# Patient Record
Sex: MALE | Race: WHITE | Employment: FULL TIME | URBAN - METROPOLITAN AREA
[De-identification: names, ages, dates, MRNs, and addresses within clinical notes are randomized per-mention and may not be internally consistent; named-entity substitution may affect disease eponyms.]

---

## 2017-05-24 ENCOUNTER — HOSPITAL ENCOUNTER (OUTPATIENT)
Age: 42
Discharge: NURSING, REHAB OR LONG-TERM CARE | End: 2017-05-24
Attending: FAMILY MEDICINE
Payer: COMMERCIAL

## 2017-05-24 ENCOUNTER — HOSPITAL ENCOUNTER (EMERGENCY)
Facility: HOSPITAL | Age: 42
Discharge: HOME | End: 2017-05-24
Attending: EMERGENCY MEDICINE
Payer: COMMERCIAL

## 2017-05-24 ENCOUNTER — APPOINTMENT (OUTPATIENT)
Dept: ULTRASOUND IMAGING | Facility: HOSPITAL | Age: 42
End: 2017-05-24
Attending: EMERGENCY MEDICINE
Payer: COMMERCIAL

## 2017-05-24 VITALS
TEMPERATURE: 98 F | HEART RATE: 83 BPM | HEIGHT: 70 IN | BODY MASS INDEX: 39.37 KG/M2 | OXYGEN SATURATION: 97 % | WEIGHT: 275 LBS | RESPIRATION RATE: 18 BRPM | DIASTOLIC BLOOD PRESSURE: 67 MMHG | SYSTOLIC BLOOD PRESSURE: 115 MMHG

## 2017-05-24 VITALS
WEIGHT: 275 LBS | HEIGHT: 70 IN | HEART RATE: 100 BPM | OXYGEN SATURATION: 99 % | TEMPERATURE: 98 F | BODY MASS INDEX: 39.37 KG/M2 | DIASTOLIC BLOOD PRESSURE: 78 MMHG | RESPIRATION RATE: 22 BRPM | SYSTOLIC BLOOD PRESSURE: 116 MMHG

## 2017-05-24 DIAGNOSIS — M79.604 PAIN IN RIGHT LEG: Primary | ICD-10-CM

## 2017-05-24 DIAGNOSIS — L03.115 CELLULITIS OF RIGHT LOWER EXTREMITY: Primary | ICD-10-CM

## 2017-05-24 PROCEDURE — 99284 EMERGENCY DEPT VISIT MOD MDM: CPT

## 2017-05-24 PROCEDURE — 93971 EXTREMITY STUDY: CPT | Performed by: EMERGENCY MEDICINE

## 2017-05-24 PROCEDURE — 99204 OFFICE O/P NEW MOD 45 MIN: CPT

## 2017-05-24 PROCEDURE — 85025 COMPLETE CBC W/AUTO DIFF WBC: CPT | Performed by: EMERGENCY MEDICINE

## 2017-05-24 PROCEDURE — 96365 THER/PROPH/DIAG IV INF INIT: CPT

## 2017-05-24 PROCEDURE — 80048 BASIC METABOLIC PNL TOTAL CA: CPT | Performed by: EMERGENCY MEDICINE

## 2017-05-24 RX ORDER — CALCIUM CARBONATE 200(500)MG
1 TABLET,CHEWABLE ORAL DAILY
COMMUNITY
End: 2018-02-09

## 2017-05-24 RX ORDER — LEVOFLOXACIN 750 MG/1
750 TABLET ORAL DAILY
Qty: 10 TABLET | Refills: 0 | Status: SHIPPED | OUTPATIENT
Start: 2017-05-24 | End: 2017-06-03

## 2017-05-24 RX ORDER — LEVOFLOXACIN 5 MG/ML
750 INJECTION, SOLUTION INTRAVENOUS EVERY 24 HOURS
Status: DISCONTINUED | OUTPATIENT
Start: 2017-05-24 | End: 2017-05-24

## 2017-05-24 RX ORDER — LANSOPRAZOLE 30 MG/1
30 CAPSULE, DELAYED RELEASE ORAL
COMMUNITY
End: 2018-01-26

## 2017-05-24 NOTE — ED NOTES
There is an area of red eczema patch to right lower inner aspect of leg with a small wound noted in area, and also a red, warm to touch patch to right outer aspect of lower leg that patient states is new. See Physical diagram for location.

## 2017-05-24 NOTE — ED NOTES
Called to [place] ED, report given to RN  for further evaluation. How Severe Are Your Spot(S)?: moderate Have Your Spot(S) Been Treated In The Past?: has not been treated Hpi Title: Evaluation of Skin Lesions Have Your Spot(S) Been Treated In The Past?: has been treated Additional History: Patient states Dr. Lowry treated the lesion under left eye, has not resolved. When Was It Treated?: Ln2 w/ Dr. Lowry

## 2017-05-24 NOTE — ED NOTES
Pt presents to ED with RLE erythema and pain which onset Monday. Pt reports pain is localized to anterior leg where erythema is noted. Denies pain in right calf.

## 2017-05-24 NOTE — ED PROVIDER NOTES
Patient Seen in: [facility] Department    History   Patient presents with:  Pain    Stated Complaint: from immed care. left leg pain.  r/o dvt    HPI    43year old male complains of 3 days of right anterior lower leg pain and redness with Head: Normocephalic and atraumatic. Head is without raccoon's eyes, without right periorbital erythema and without left periorbital erythema.    Nose: Nose normal.   Mouth/Throat: Mucous membranes are normal. Mucous membranes are not pale and not cyanotic Final Result    PROCEDURE: US VENOUS DOPPLER LEG RIGHT-DIAG IMG (CPT=93971)         COMPARISON: None.          INDICATIONS: Right leg swelling and lateral calf redness         TECHNIQUE: Color duplex Doppler venous ultrasound of the right lower     extremit Radiology reports and images reviewed personally and are negative for emergent cause of patient symptoms. I discussed pertinent results, any required  acute management issues, and/or I did need for follow-up, with the patient/gaurdian.  See radiology report

## 2017-05-24 NOTE — ED NOTES
Pt instructed to go directly to [place] ED for further evaluation to r/o DVT right lower leg. Pt verbalizes understanding.

## 2017-05-24 NOTE — ED PROVIDER NOTES
Patient Seen [address]: [address]    History   No chief complaint on file. Stated Complaint: Fever     HPI    51-year-old male presents with 2 days of increasing right lower extremity swelling and redness.   He notes gradual 39.46 kg/m2  SpO2 99%        Physical Exam   Constitutional: He is oriented to person, place, and time. He appears well-developed and well-nourished. Cardiovascular: Normal rate, regular rhythm, normal heart sounds and intact distal pulses.     Pulmonary/

## 2017-05-24 NOTE — ED INITIAL ASSESSMENT (HPI)
Pt here to IC with c/o feeling of fever for several days and noticed a red patch on right lower leg that is warm to touch and pt states it hurts to walk and is \"tight\" compared to left leg.   There is swelling noted to right lower leg area compared to lef

## 2017-12-26 ENCOUNTER — HOSPITAL ENCOUNTER (OUTPATIENT)
Age: 42
Discharge: HOME | End: 2017-12-26
Attending: EMERGENCY MEDICINE
Payer: COMMERCIAL

## 2017-12-26 VITALS
HEART RATE: 90 BPM | OXYGEN SATURATION: 98 % | SYSTOLIC BLOOD PRESSURE: 133 MMHG | DIASTOLIC BLOOD PRESSURE: 82 MMHG | TEMPERATURE: 99 F | RESPIRATION RATE: 16 BRPM

## 2017-12-26 DIAGNOSIS — H66.92 ACUTE LEFT OTITIS MEDIA: Primary | ICD-10-CM

## 2017-12-26 PROCEDURE — 87430 STREP A AG IA: CPT

## 2017-12-26 PROCEDURE — 99214 OFFICE O/P EST MOD 30 MIN: CPT

## 2017-12-26 PROCEDURE — 99213 OFFICE O/P EST LOW 20 MIN: CPT

## 2017-12-26 RX ORDER — VARENICLINE TARTRATE 0.5 MG/1
0.5 TABLET, FILM COATED ORAL 2 TIMES DAILY
COMMUNITY
End: 2018-01-26

## 2017-12-26 RX ORDER — SULFAMETHOXAZOLE AND TRIMETHOPRIM 800; 160 MG/1; MG/1
1 TABLET ORAL 2 TIMES DAILY
Qty: 14 TABLET | Refills: 0 | Status: SHIPPED | OUTPATIENT
Start: 2017-12-26 | End: 2018-01-02

## 2017-12-27 NOTE — ED PROVIDER NOTES
Patient Seen in: [address]    History   Patient presents with:  Sore Throat    Stated Complaint: Sore throat, ear pain    HPI    The patient's a 44-year-old male who complains of a a URI for the last week and now notes all antibiotics except for Levaquin. I asked the patient has feels ever had a sulfa medication and he stated no therefore we will treat with Bactrim as it has a much better side effect profile than Levaquin.           Disposition and Plan     Clinical Impr

## 2017-12-27 NOTE — ED NOTES
Pt discharged home , prescription electronically sent to the pharmacy , pt instructed to follow up with primary md if symptoms do not improve

## 2017-12-27 NOTE — ED INITIAL ASSESSMENT (HPI)
Pt here with complaints of a sore throat since Wednesday, pt states he has been running fevers , pt states that he is having bilateral ear pain that started today

## 2018-01-26 ENCOUNTER — OFFICE VISIT (OUTPATIENT)
Dept: FAMILY MEDICINE CLINIC | Facility: CLINIC | Age: 43
End: 2018-01-26

## 2018-01-26 VITALS
DIASTOLIC BLOOD PRESSURE: 72 MMHG | OXYGEN SATURATION: 97 % | WEIGHT: 249 LBS | TEMPERATURE: 98 F | HEIGHT: 70 IN | HEART RATE: 76 BPM | SYSTOLIC BLOOD PRESSURE: 118 MMHG | BODY MASS INDEX: 35.65 KG/M2

## 2018-01-26 DIAGNOSIS — J18.9 COMMUNITY ACQUIRED PNEUMONIA, UNSPECIFIED LATERALITY: Primary | ICD-10-CM

## 2018-01-26 DIAGNOSIS — H65.195 OTHER RECURRENT ACUTE NONSUPPURATIVE OTITIS MEDIA OF LEFT EAR: ICD-10-CM

## 2018-01-26 PROCEDURE — 94640 AIRWAY INHALATION TREATMENT: CPT | Performed by: FAMILY MEDICINE

## 2018-01-26 PROCEDURE — 99203 OFFICE O/P NEW LOW 30 MIN: CPT | Performed by: FAMILY MEDICINE

## 2018-01-26 RX ORDER — CODEINE PHOSPHATE AND GUAIFENESIN 10; 100 MG/5ML; MG/5ML
SOLUTION ORAL
Refills: 0 | COMMUNITY
Start: 2018-01-21 | End: 2018-01-26 | Stop reason: ALTCHOICE

## 2018-01-26 RX ORDER — DOXYCYCLINE 100 MG/1
CAPSULE ORAL
Refills: 0 | COMMUNITY
Start: 2018-01-21 | End: 2018-01-26

## 2018-01-26 RX ORDER — BENZONATATE 200 MG/1
200 CAPSULE ORAL 3 TIMES DAILY PRN
COMMUNITY
Start: 2018-01-21 | End: 2018-02-09

## 2018-01-26 RX ORDER — SULFAMETHOXAZOLE AND TRIMETHOPRIM 800; 160 MG/1; MG/1
1 TABLET ORAL 2 TIMES DAILY
Qty: 20 TABLET | Refills: 0 | Status: SHIPPED | OUTPATIENT
Start: 2018-01-26 | End: 2018-02-05

## 2018-01-26 RX ORDER — DOXYCYCLINE 100 MG/1
100 CAPSULE ORAL 2 TIMES DAILY
COMMUNITY
Start: 2018-01-21 | End: 2018-02-09

## 2018-01-26 RX ORDER — BENZONATATE 200 MG/1
CAPSULE ORAL
Refills: 0 | COMMUNITY
Start: 2018-01-21 | End: 2018-01-26

## 2018-01-26 RX ORDER — ALBUTEROL SULFATE 2.5 MG/3ML
2.5 SOLUTION RESPIRATORY (INHALATION) ONCE
Status: COMPLETED | OUTPATIENT
Start: 2018-01-26 | End: 2018-01-26

## 2018-01-26 RX ORDER — ALBUTEROL SULFATE 90 UG/1
2 AEROSOL, METERED RESPIRATORY (INHALATION) EVERY 6 HOURS PRN
Qty: 1 INHALER | Refills: 0 | Status: SHIPPED | OUTPATIENT
Start: 2018-01-26 | End: 2018-02-09

## 2018-01-26 RX ADMIN — ALBUTEROL SULFATE 2.5 MG: 2.5 SOLUTION RESPIRATORY (INHALATION) at 14:31:00

## 2018-01-26 NOTE — PATIENT INSTRUCTIONS
Pneumonia (Adult)  Pneumonia is an infection deep within the lungs. It is in the small air sacs (alveoli). Pneumonia may be caused by a virus or bacteria. Pneumonia caused by bacteria is usually treated with an antibiotic.  Severe cases may need to be median If you are 72 or older, you should get a pneumococcal vaccine and a yearly flu (influenza) shot. You should also get these vaccines if you have chronic lung disease like asthma, emphysema, or COPD.  Recently, a second type of pneumonia vaccine has become av

## 2018-01-26 NOTE — PROGRESS NOTES
CC:  Patient presents with:  Establish Care  Follow - Up: was seen at an urgent care for Pneumonia was given antibiotics- cough is a little better      HPI: 43year old male here to follow-up on urgent care visit for pneumonia.   Reports he had wheezing, co Obesity  No date: Psoriasis     History reviewed. No pertinent surgical history.       Social History  Social History   Marital status: Single  Spouse name: N/A    Years of education: N/A  Number of children: N/A     Occupational History  None on file     S laterality    - Mildly improved on day 6 of Doxycycline  - Given wheezing on history and exam and slight improvement after in-office Albuterol breathing treatment, patient given prescription for Albuterol inhaler to use at home every 4-6 hours as needed

## 2018-02-09 ENCOUNTER — APPOINTMENT (OUTPATIENT)
Dept: LAB | Facility: REFERENCE LAB | Age: 43
End: 2018-02-09
Attending: FAMILY MEDICINE
Payer: COMMERCIAL

## 2018-02-09 ENCOUNTER — OFFICE VISIT (OUTPATIENT)
Dept: FAMILY MEDICINE CLINIC | Facility: CLINIC | Age: 43
End: 2018-02-09

## 2018-02-09 VITALS
BODY MASS INDEX: 35.79 KG/M2 | OXYGEN SATURATION: 98 % | DIASTOLIC BLOOD PRESSURE: 82 MMHG | HEART RATE: 72 BPM | HEIGHT: 70 IN | SYSTOLIC BLOOD PRESSURE: 124 MMHG | WEIGHT: 250 LBS

## 2018-02-09 DIAGNOSIS — Z23 NEED FOR VACCINATION: ICD-10-CM

## 2018-02-09 DIAGNOSIS — Z00.01 ENCOUNTER FOR ROUTINE ADULT HEALTH EXAMINATION WITH ABNORMAL FINDINGS: Primary | ICD-10-CM

## 2018-02-09 DIAGNOSIS — Z13.220 SCREENING CHOLESTEROL LEVEL: ICD-10-CM

## 2018-02-09 DIAGNOSIS — Z13.1 DIABETES MELLITUS SCREENING: ICD-10-CM

## 2018-02-09 DIAGNOSIS — Z87.891 PERSONAL HISTORY OF TOBACCO USE, PRESENTING HAZARDS TO HEALTH: ICD-10-CM

## 2018-02-09 PROBLEM — J18.9 COMMUNITY ACQUIRED PNEUMONIA: Status: RESOLVED | Noted: 2018-01-26 | Resolved: 2018-02-09

## 2018-02-09 PROBLEM — L40.9 PSORIASIS: Status: ACTIVE | Noted: 2018-02-09

## 2018-02-09 LAB
CHOLEST SERPL-MCNC: 150 MG/DL (ref 110–200)
HDLC SERPL-MCNC: 43 MG/DL
LDLC SERPL CALC-MCNC: 89 MG/DL (ref 0–99)
NONHDLC SERPL-MCNC: 107 MG/DL
TRIGL SERPL-MCNC: 90 MG/DL (ref 1–149)

## 2018-02-09 PROCEDURE — 36415 COLL VENOUS BLD VENIPUNCTURE: CPT | Performed by: FAMILY MEDICINE

## 2018-02-09 PROCEDURE — 90686 IIV4 VACC NO PRSV 0.5 ML IM: CPT | Performed by: FAMILY MEDICINE

## 2018-02-09 PROCEDURE — 80061 LIPID PANEL: CPT | Performed by: FAMILY MEDICINE

## 2018-02-09 PROCEDURE — 90472 IMMUNIZATION ADMIN EACH ADD: CPT | Performed by: FAMILY MEDICINE

## 2018-02-09 PROCEDURE — 83036 HEMOGLOBIN GLYCOSYLATED A1C: CPT | Performed by: FAMILY MEDICINE

## 2018-02-09 PROCEDURE — 90715 TDAP VACCINE 7 YRS/> IM: CPT | Performed by: FAMILY MEDICINE

## 2018-02-09 PROCEDURE — 99396 PREV VISIT EST AGE 40-64: CPT | Performed by: FAMILY MEDICINE

## 2018-02-09 PROCEDURE — 90471 IMMUNIZATION ADMIN: CPT | Performed by: FAMILY MEDICINE

## 2018-02-09 NOTE — PROGRESS NOTES
Mick Pitts is a 43year old male who presents for a complete physical exam.   HPI:   Has not had a physical in a few years. Concerns: Weight, has been making changes this new year with better exercise routine but still eats poorly at times.    Has psor History   Problem Relation Age of Onset   • Heart Attack Father      MI x 6   • Diabetes Father    • Alcohol and Other Disorders Associated Father    • Cancer Mother      Kidney   • Heart Attack Mother    • Cancer Maternal Grandmother    • Cancer Maternal Vaccine, Quadravalent, Preservative-Free, Prefilled    Prescription for Chantix provided given concern for stress of job and risk of starting smoking again.      Discussed with patient the following:  -Risks and benefits of screening for prostate cancer: Pl

## 2018-02-09 NOTE — PATIENT INSTRUCTIONS
Prevention Guidelines, Men Ages 36 to 52  Screening tests and vaccines are an important part of managing your health. Health counseling is essential, too. Below are guidelines for these, for men ages 36 to 52.  Talk with your healthcare provider to make s Chickenpox (varicella) All men in this age group who have no record of this infection or vaccine 2 doses; the second dose should be given at least 4 weeks after the first dose   Hepatitis A Men at increased risk for infection – talk with your healthcare pr Use of tobacco and the health effects it can cause All men in this age group Every exam   [facility]  Date Last Reviewed: 2/1/2017  © 3170-5157 [employer] [address].  [address] [facility]

## 2018-02-10 LAB — HBA1C MFR BLD: 5.3 % (ref 4–6)

## 2018-02-25 ENCOUNTER — HOSPITAL ENCOUNTER (OUTPATIENT)
Age: 43
Discharge: HOME | End: 2018-02-25
Attending: EMERGENCY MEDICINE
Payer: COMMERCIAL

## 2018-02-25 VITALS
TEMPERATURE: 98 F | SYSTOLIC BLOOD PRESSURE: 126 MMHG | HEART RATE: 64 BPM | RESPIRATION RATE: 20 BRPM | OXYGEN SATURATION: 98 % | DIASTOLIC BLOOD PRESSURE: 76 MMHG

## 2018-02-25 DIAGNOSIS — S39.012A STRAIN OF LUMBAR REGION, INITIAL ENCOUNTER: Primary | ICD-10-CM

## 2018-02-25 PROCEDURE — 99213 OFFICE O/P EST LOW 20 MIN: CPT

## 2018-02-25 PROCEDURE — 99214 OFFICE O/P EST MOD 30 MIN: CPT

## 2018-02-25 RX ORDER — KETOROLAC TROMETHAMINE 10 MG/1
10 TABLET, FILM COATED ORAL EVERY 8 HOURS
Qty: 20 TABLET | Refills: 0 | Status: SHIPPED | OUTPATIENT
Start: 2018-02-25 | End: 2018-03-04

## 2018-02-25 RX ORDER — CYCLOBENZAPRINE HCL 10 MG
10 TABLET ORAL 3 TIMES DAILY PRN
Qty: 20 TABLET | Refills: 0 | Status: SHIPPED | OUTPATIENT
Start: 2018-02-25 | End: 2018-03-04

## 2018-02-25 RX ORDER — PREDNISONE 20 MG/1
60 TABLET ORAL ONCE
Status: COMPLETED | OUTPATIENT
Start: 2018-02-25 | End: 2018-02-25

## 2018-02-25 RX ORDER — PREDNISONE 20 MG/1
40 TABLET ORAL DAILY
Qty: 6 TABLET | Refills: 0 | Status: SHIPPED | OUTPATIENT
Start: 2018-02-25 | End: 2018-02-28

## 2018-02-25 NOTE — ED INITIAL ASSESSMENT (HPI)
Pt here with complaints of lower back pain that started a couple weeks ago and has gotten worse that its really hard to ambulate now, pt denies any injuries

## 2018-02-25 NOTE — ED NOTES
Pt discharged home , prescription electronically sent to the pharmacy , pt instructed to follow up with his primary md if symptoms do not improve

## 2018-02-25 NOTE — ED PROVIDER NOTES
Patient Seen in: [address]    History   Patient presents with:  Back Pain (musculoskeletal)    Stated Complaint: severe back pain    HPI    Chief complaint: Back pain    History of present illness:   The patient complains Grandfather        Smoking status: Former Smoker                                                              Packs/day: 0.00      Years: 20.00        Quit date: 10/15/2017  Smokeless tobacco: Never Used                      Alcohol use:  Yes              C diagnosis)    Disposition:  Discharge    Follow-up:  Nieves Ramos, [address] [facility]  [address]  [address] [facility], [facility], [address] [facility] [address] [phone]            Medications Prescribed:  C

## 2019-02-20 ENCOUNTER — OFFICE VISIT (OUTPATIENT)
Dept: FAMILY MEDICINE CLINIC | Facility: CLINIC | Age: 44
End: 2019-02-20
Payer: COMMERCIAL

## 2019-02-20 ENCOUNTER — LAB ENCOUNTER (OUTPATIENT)
Dept: LAB | Facility: REFERENCE LAB | Age: 44
End: 2019-02-20
Attending: FAMILY MEDICINE
Payer: COMMERCIAL

## 2019-02-20 VITALS
DIASTOLIC BLOOD PRESSURE: 80 MMHG | HEART RATE: 101 BPM | HEIGHT: 70 IN | SYSTOLIC BLOOD PRESSURE: 122 MMHG | WEIGHT: 283 LBS | BODY MASS INDEX: 40.52 KG/M2 | OXYGEN SATURATION: 98 %

## 2019-02-20 DIAGNOSIS — Z23 NEED FOR VACCINATION: ICD-10-CM

## 2019-02-20 DIAGNOSIS — Z00.01 ENCOUNTER FOR ROUTINE ADULT HEALTH EXAMINATION WITH ABNORMAL FINDINGS: ICD-10-CM

## 2019-02-20 DIAGNOSIS — M25.562 ARTHRALGIA OF BOTH KNEES: ICD-10-CM

## 2019-02-20 DIAGNOSIS — L40.9 PSORIASIS: ICD-10-CM

## 2019-02-20 DIAGNOSIS — M25.561 ARTHRALGIA OF BOTH KNEES: ICD-10-CM

## 2019-02-20 DIAGNOSIS — Z00.01 ENCOUNTER FOR ROUTINE ADULT HEALTH EXAMINATION WITH ABNORMAL FINDINGS: Primary | ICD-10-CM

## 2019-02-20 LAB
BASOPHILS # BLD AUTO: 0.05 X10(3) UL (ref 0–0.2)
BASOPHILS NFR BLD AUTO: 0.6 %
CHOLEST SMN-MCNC: 122 MG/DL (ref ?–200)
DEPRECATED RDW RBC AUTO: 38.5 FL (ref 35.1–46.3)
EOSINOPHIL # BLD AUTO: 0.45 X10(3) UL (ref 0–0.7)
EOSINOPHIL NFR BLD AUTO: 5.4 %
ERYTHROCYTE [DISTWIDTH] IN BLOOD BY AUTOMATED COUNT: 12.3 % (ref 11–15)
ERYTHROCYTE [SEDIMENTATION RATE] IN BLOOD: 6 MM/HR (ref 0–15)
EST. AVERAGE GLUCOSE BLD GHB EST-MCNC: 111 MG/DL (ref 68–126)
HBA1C MFR BLD HPLC: 5.5 % (ref ?–5.7)
HCT VFR BLD AUTO: 47.1 % (ref 39–53)
HDLC SERPL-MCNC: 37 MG/DL (ref 40–59)
HGB BLD-MCNC: 15.8 G/DL (ref 13–17.5)
IMM GRANULOCYTES # BLD AUTO: 0.02 X10(3) UL (ref 0–1)
IMM GRANULOCYTES NFR BLD: 0.2 %
LDLC SERPL CALC-MCNC: 53 MG/DL (ref ?–100)
LYMPHOCYTES # BLD AUTO: 2.44 X10(3) UL (ref 1–4)
LYMPHOCYTES NFR BLD AUTO: 29.1 %
MCH RBC QN AUTO: 28.9 PG (ref 26–34)
MCHC RBC AUTO-ENTMCNC: 33.5 G/DL (ref 31–37)
MCV RBC AUTO: 86.1 FL (ref 80–100)
MONOCYTES # BLD AUTO: 0.56 X10(3) UL (ref 0.1–1)
MONOCYTES NFR BLD AUTO: 6.7 %
NEUTROPHILS # BLD AUTO: 4.87 X10 (3) UL (ref 1.5–7.7)
NEUTROPHILS # BLD AUTO: 4.87 X10(3) UL (ref 1.5–7.7)
NEUTROPHILS NFR BLD AUTO: 58 %
NONHDLC SERPL-MCNC: 85 MG/DL (ref ?–130)
PLATELET # BLD AUTO: 269 10(3)UL (ref 150–450)
RBC # BLD AUTO: 5.47 X10(6)UL (ref 4.3–5.7)
TRIGL SERPL-MCNC: 158 MG/DL (ref 30–149)
WBC # BLD AUTO: 8.4 X10(3) UL (ref 4–11)

## 2019-02-20 PROCEDURE — 36415 COLL VENOUS BLD VENIPUNCTURE: CPT

## 2019-02-20 PROCEDURE — 80061 LIPID PANEL: CPT

## 2019-02-20 PROCEDURE — 85652 RBC SED RATE AUTOMATED: CPT

## 2019-02-20 PROCEDURE — 90686 IIV4 VACC NO PRSV 0.5 ML IM: CPT | Performed by: FAMILY MEDICINE

## 2019-02-20 PROCEDURE — 85025 COMPLETE CBC W/AUTO DIFF WBC: CPT

## 2019-02-20 PROCEDURE — 99396 PREV VISIT EST AGE 40-64: CPT | Performed by: FAMILY MEDICINE

## 2019-02-20 PROCEDURE — 83036 HEMOGLOBIN GLYCOSYLATED A1C: CPT

## 2019-02-20 PROCEDURE — 90471 IMMUNIZATION ADMIN: CPT | Performed by: FAMILY MEDICINE

## 2019-02-20 RX ORDER — CLOBETASOL PROPIONATE 0.5 MG/G
1 OINTMENT TOPICAL 2 TIMES DAILY
Qty: 60 G | Refills: 0 | Status: SHIPPED | OUTPATIENT
Start: 2019-02-20 | End: 2021-03-04

## 2019-02-20 NOTE — PROGRESS NOTES
Wilton Tatum is a 37year old male who presents for a complete physical exam.   HPI:   Patient presents with:  Physical  Derm Problem: needs a referral for derm because he has a rash that looks like psoriasis on his hands and feet    Concerns: Started smo • Cancer Mother         Kidney   • Heart Attack Mother    • Cancer Maternal Grandmother    • Cancer Maternal Grandfather    • Cancer Paternal Grandmother         Leukemia   • Cancer Paternal Grandfather    • Dementia Paternal Grandfather       Social His Preservative Free [40206]    Start Clobetasol ointment twice daily x 2-4 weeks for psoriasis flare-ups of thumbs and toes and also to schedule with dermatology to discuss long-term management.   Will also check ESR given concerns for joint pain and possible

## 2019-02-20 NOTE — PATIENT INSTRUCTIONS
Managing Psoriasis     Take baths in warm water to help soften scales. The success of your medical treatment depends on you. When your healthcare provider gives you a treatment plan, ask when you should expect to see results. Then, follow your plan.  If · Use over-the-counter hydrocortisone cream for itching to reduce scaling for active outbreaks. Ask your healthcare provider about long-term use.   · Stick with treatment that your healthcare provider has recommended for you, especially if it's controlling If your blood pressure is higher than normal, follow the advice of your healthcare provider      Depression All men in this age group At routine exams   Type 2 diabetes or prediabetes All men beginning at age 39 and men  without symptoms at any age who are Haemophilus influenzae Type B (HIB) Men at increased risk for infection – talk with your healthcare provider 1 to 3 doses   Influenza (flu) All men in this age group Once a year   Measles, mumps, rubella (MMR) All men in this age group who have no record o

## 2019-04-01 ENCOUNTER — HOSPITAL ENCOUNTER (OUTPATIENT)
Age: 44
Discharge: HOME | End: 2019-04-01
Payer: COMMERCIAL

## 2019-04-01 ENCOUNTER — APPOINTMENT (OUTPATIENT)
Dept: GENERAL RADIOLOGY | Age: 44
End: 2019-04-01
Attending: FAMILY MEDICINE
Payer: COMMERCIAL

## 2019-04-01 VITALS
WEIGHT: 275 LBS | TEMPERATURE: 97 F | OXYGEN SATURATION: 100 % | HEART RATE: 90 BPM | DIASTOLIC BLOOD PRESSURE: 72 MMHG | RESPIRATION RATE: 18 BRPM | BODY MASS INDEX: 39.37 KG/M2 | SYSTOLIC BLOOD PRESSURE: 129 MMHG | HEIGHT: 70 IN

## 2019-04-01 DIAGNOSIS — S46.011A ROTATOR CUFF STRAIN, RIGHT, INITIAL ENCOUNTER: Primary | ICD-10-CM

## 2019-04-01 PROCEDURE — 73030 X-RAY EXAM OF SHOULDER: CPT | Performed by: FAMILY MEDICINE

## 2019-04-01 PROCEDURE — 99214 OFFICE O/P EST MOD 30 MIN: CPT

## 2019-04-01 PROCEDURE — 99213 OFFICE O/P EST LOW 20 MIN: CPT

## 2019-04-01 RX ORDER — ETODOLAC 500 MG/1
500 TABLET, FILM COATED ORAL 2 TIMES DAILY
Qty: 28 TABLET | Refills: 0 | Status: SHIPPED | OUTPATIENT
Start: 2019-04-01 | End: 2019-04-15

## 2019-04-01 NOTE — ED NOTES
Pt discharged home stable and in good condition by self. All prescriptions reviewed. Avs explained. Follow up with pcp. Pt verbalized understanding and agreed.

## 2019-04-01 NOTE — ED INITIAL ASSESSMENT (HPI)
Pt reports right shoulder pain that has been intermittent for months, pt reports worse pain with certain movement denies injury. Reports had trauma to shoulder when he was 12 yrs old denies any recent injury.

## 2019-04-01 NOTE — ED PROVIDER NOTES
Patient Seen in: [address]    History   Patient presents with:  Shoulder Pain    Stated Complaint: Right Shoulder Pain     HPI    HPI: Rayna Bolanos is a 40year old male who presents with right shoulder pain of gradual [04/01/19 1421]   /72   Pulse 90   Resp 18   Temp 97.2 °F (36.2 °C)   Temp src Oral   SpO2 100 %   O2 Device None (Room air)       Current:/72   Pulse 90   Temp 97.2 °F (36.2 °C) (Oral)   Resp 18   Ht 177.8 cm (5' 10\")   Wt 124.7 kg   SpO2 100

## 2019-04-09 ENCOUNTER — OFFICE VISIT (OUTPATIENT)
Dept: FAMILY MEDICINE CLINIC | Facility: CLINIC | Age: 44
End: 2019-04-09
Payer: COMMERCIAL

## 2019-04-09 VITALS
HEIGHT: 70 IN | BODY MASS INDEX: 40.8 KG/M2 | DIASTOLIC BLOOD PRESSURE: 74 MMHG | HEART RATE: 80 BPM | OXYGEN SATURATION: 98 % | WEIGHT: 285 LBS | SYSTOLIC BLOOD PRESSURE: 126 MMHG

## 2019-04-09 DIAGNOSIS — M75.81 RIGHT ROTATOR CUFF TENDINITIS: Primary | ICD-10-CM

## 2019-04-09 PROCEDURE — 20610 DRAIN/INJ JOINT/BURSA W/O US: CPT | Performed by: FAMILY MEDICINE

## 2019-04-09 PROCEDURE — 99213 OFFICE O/P EST LOW 20 MIN: CPT | Performed by: FAMILY MEDICINE

## 2019-04-09 RX ORDER — LIDOCAINE HYDROCHLORIDE 10 MG/ML
2 INJECTION, SOLUTION INFILTRATION; PERINEURAL ONCE
Status: COMPLETED | OUTPATIENT
Start: 2019-04-09 | End: 2019-04-09

## 2019-04-09 RX ORDER — TRIAMCINOLONE ACETONIDE 40 MG/ML
40 INJECTION, SUSPENSION INTRA-ARTICULAR; INTRAMUSCULAR ONCE
Status: COMPLETED | OUTPATIENT
Start: 2019-04-09 | End: 2019-04-09

## 2019-04-09 RX ADMIN — TRIAMCINOLONE ACETONIDE 40 MG: 40 INJECTION, SUSPENSION INTRA-ARTICULAR; INTRAMUSCULAR at 14:46:00

## 2019-04-09 RX ADMIN — LIDOCAINE HYDROCHLORIDE 2 ML: 10 INJECTION, SOLUTION INFILTRATION; PERINEURAL at 14:45:00

## 2019-04-09 NOTE — PROGRESS NOTES
CC:  Patient presents with: Follow - Up: was seen at the immediate care on4/1 for right shoulder pain- still having pain      HPI: 40year old male here to discuss right shoulder pain after immediate care visit 4/1.   Reports he  his shoulder twic since quittin.4      Smokeless tobacco: Never Used    Substance and Sexual Activity      Alcohol use: Yes        Comment: rare bourbon       Drug use: Yes        Types: Cannabis        Comment: Occasionally       Sexual activity: Not on file    Lifesty shoulder: tenderness to palpation of anterior shoulder and supraspinatus tendon insertion. Full range of shoulder motion but pain reported. 5/5 muscle strength bilateral proximal and distal upper extremities. Positive Job's and Hawkin's testing.  Negative N

## 2019-06-10 ENCOUNTER — OFFICE VISIT (OUTPATIENT)
Dept: SURGERY | Facility: CLINIC | Age: 44
End: 2019-06-10
Payer: COMMERCIAL

## 2019-06-10 VITALS
HEART RATE: 84 BPM | BODY MASS INDEX: 40.06 KG/M2 | DIASTOLIC BLOOD PRESSURE: 81 MMHG | HEIGHT: 70.5 IN | OXYGEN SATURATION: 97 % | WEIGHT: 283 LBS | SYSTOLIC BLOOD PRESSURE: 144 MMHG | RESPIRATION RATE: 16 BRPM

## 2019-06-10 DIAGNOSIS — E66.01 MORBID OBESITY WITH BMI OF 40.0-44.9, ADULT (HCC): ICD-10-CM

## 2019-06-10 DIAGNOSIS — E78.1 HYPERTRIGLYCERIDEMIA: Primary | ICD-10-CM

## 2019-06-10 DIAGNOSIS — R03.0 ELEVATED BLOOD PRESSURE READING: ICD-10-CM

## 2019-06-10 DIAGNOSIS — F43.9 STRESS: ICD-10-CM

## 2019-06-10 DIAGNOSIS — R06.83 SNORING: ICD-10-CM

## 2019-06-10 PROCEDURE — 99204 OFFICE O/P NEW MOD 45 MIN: CPT | Performed by: INTERNAL MEDICINE

## 2019-06-10 RX ORDER — HYDROCHLOROTHIAZIDE 12.5 MG/1
12.5 CAPSULE, GELATIN COATED ORAL DAILY
Qty: 30 CAPSULE | Refills: 1 | Status: SHIPPED | OUTPATIENT
Start: 2019-06-10 | End: 2019-08-24

## 2019-06-10 RX ORDER — TOPIRAMATE 25 MG/1
25 TABLET ORAL EVERY EVENING
Qty: 30 TABLET | Refills: 2 | Status: SHIPPED | OUTPATIENT
Start: 2019-06-10 | End: 2021-03-04

## 2019-06-10 RX ORDER — VARENICLINE TARTRATE 0.5 MG/1
0.5 TABLET, FILM COATED ORAL 2 TIMES DAILY
COMMUNITY
End: 2019-07-25 | Stop reason: DRUGHIGH

## 2019-06-10 NOTE — PROGRESS NOTES
[facility] Consultation Note       Date of Consult:  6/10/2019    Patient:  Leonard Ross  :      3/1/1975  MRN:      [id number]    Referring Provider: Dr. Esperanza Slater       Chief Complaint:  Patient presents with:  Consult  Weight Manageme Substance and Sexual Activity      Alcohol use: Yes        Comment: rare bourbon       Drug use: Yes        Types: Cannabis        Comment: Occasionally       Sexual activity: Not on file    Lifestyle      Physical activity:        Days per week: Not on fi fast food meals/week:  4 meals/week    Nutritional Goals Reviewed and Discussed:     Limit carbohydrates to 100 gms per day, Eat 100-200 calories within 1 hour of waking up and Eat 3-4 cups of fresh fruit or vegetables daily    Behavior Modifications Revie Encounter Diagnosis(ses):   Hypertriglyceridemia  (primary encounter diagnosis)  Stress  Snoring  Morbid obesity with BMI of 40.0-44.9, adult (HCC)  Elevated blood pressure reading    PLAN     Patient is not interested in bariatric surgery.  Kiera mouth daily.         oDminique Mccarthy MD

## 2019-06-27 ENCOUNTER — MED REC SCAN ONLY (OUTPATIENT)
Dept: FAMILY MEDICINE CLINIC | Facility: CLINIC | Age: 44
End: 2019-06-27

## 2019-07-25 ENCOUNTER — OFFICE VISIT (OUTPATIENT)
Dept: SURGERY | Facility: CLINIC | Age: 44
End: 2019-07-25
Payer: COMMERCIAL

## 2019-07-25 ENCOUNTER — TELEPHONE (OUTPATIENT)
Dept: FAMILY MEDICINE CLINIC | Facility: CLINIC | Age: 44
End: 2019-07-25

## 2019-07-25 VITALS
DIASTOLIC BLOOD PRESSURE: 70 MMHG | HEIGHT: 70.5 IN | HEART RATE: 76 BPM | SYSTOLIC BLOOD PRESSURE: 123 MMHG | RESPIRATION RATE: 16 BRPM | OXYGEN SATURATION: 96 % | WEIGHT: 273 LBS | BODY MASS INDEX: 38.65 KG/M2

## 2019-07-25 DIAGNOSIS — E78.1 HYPERTRIGLYCERIDEMIA: Primary | ICD-10-CM

## 2019-07-25 DIAGNOSIS — E66.9 OBESITY (BMI 30-39.9): ICD-10-CM

## 2019-07-25 DIAGNOSIS — R06.83 SNORING: Primary | ICD-10-CM

## 2019-07-25 DIAGNOSIS — F43.9 STRESS: ICD-10-CM

## 2019-07-25 DIAGNOSIS — Z51.81 ENCOUNTER FOR THERAPEUTIC DRUG MONITORING: ICD-10-CM

## 2019-07-25 PROCEDURE — 99214 OFFICE O/P EST MOD 30 MIN: CPT | Performed by: INTERNAL MEDICINE

## 2019-07-25 RX ORDER — VARENICLINE TARTRATE 1 MG/1
1 TABLET, FILM COATED ORAL 2 TIMES DAILY
Qty: 60 TABLET | Refills: 6 | Status: SHIPPED | OUTPATIENT
Start: 2019-07-25 | End: 2020-02-20

## 2019-07-25 NOTE — TELEPHONE ENCOUNTER
Pt encouraged to make appt for sleep study. Pt was driving, this RN will send a [employer] message with the contact info to schedule that appt. Pt verbalized understanding and agrees with plan.     Citlalli Velasco 10 Dr. Dee Bolton             Please

## 2019-07-25 NOTE — PROGRESS NOTES
[address]  Dept: [phone]       Patient:  Sandra Garcia  :      3/1/1975  MRN:      [id number]    Chief Complaint:  Patient presents wit Food insecurity:        Worry: Not on file        Inability: Not on file      Transportation needs:        Medical: Not on file        Non-medical: Not on file    Tobacco Use      Smoking status: Former Smoker        Years: 20.00        Quit date: 10/15/20 Discussed:       · Patient has a Food Journal?: yes   · Patient is reading nutrition labels? yes  · Average Caloric Intake:     · Average CHO Intake: 130  · Is patient exercising? no  · Type of exercise?      Eating Habits  · Patient states the following: extremities normal, atraumatic, no cyanosis or edema  Pulses: 2+ and symmetric  Skin: Skin color, texture, turgor normal. No rashes or lesions  Neurologic: Grossly normal    ASSESSMENT     HYPERCHOLESTEROLEMIA:  The patient states that his cholesterol has

## 2019-08-24 DIAGNOSIS — R03.0 ELEVATED BLOOD PRESSURE READING: ICD-10-CM

## 2019-08-24 DIAGNOSIS — E66.01 MORBID OBESITY WITH BMI OF 40.0-44.9, ADULT (HCC): ICD-10-CM

## 2019-08-26 RX ORDER — HYDROCHLOROTHIAZIDE 12.5 MG/1
CAPSULE, GELATIN COATED ORAL
Qty: 90 CAPSULE | Refills: 0 | Status: SHIPPED | OUTPATIENT
Start: 2019-08-26 | End: 2021-03-04 | Stop reason: ALTCHOICE

## 2019-10-30 ENCOUNTER — OFFICE VISIT (OUTPATIENT)
Dept: SURGERY | Facility: CLINIC | Age: 44
End: 2019-10-30
Payer: COMMERCIAL

## 2019-10-30 ENCOUNTER — APPOINTMENT (OUTPATIENT)
Dept: LAB | Facility: HOSPITAL | Age: 44
End: 2019-10-30
Attending: INTERNAL MEDICINE
Payer: COMMERCIAL

## 2019-10-30 VITALS
DIASTOLIC BLOOD PRESSURE: 72 MMHG | HEIGHT: 70.5 IN | HEART RATE: 77 BPM | SYSTOLIC BLOOD PRESSURE: 110 MMHG | OXYGEN SATURATION: 97 % | WEIGHT: 275 LBS | BODY MASS INDEX: 38.93 KG/M2

## 2019-10-30 DIAGNOSIS — E78.1 HYPERTRIGLYCERIDEMIA: Primary | ICD-10-CM

## 2019-10-30 DIAGNOSIS — Z51.81 ENCOUNTER FOR THERAPEUTIC DRUG MONITORING: ICD-10-CM

## 2019-10-30 DIAGNOSIS — E78.1 HYPERTRIGLYCERIDEMIA: ICD-10-CM

## 2019-10-30 DIAGNOSIS — F43.9 STRESS: ICD-10-CM

## 2019-10-30 DIAGNOSIS — E66.9 OBESITY (BMI 30-39.9): ICD-10-CM

## 2019-10-30 PROCEDURE — 99214 OFFICE O/P EST MOD 30 MIN: CPT | Performed by: INTERNAL MEDICINE

## 2019-10-30 PROCEDURE — 93010 ELECTROCARDIOGRAM REPORT: CPT | Performed by: INTERNAL MEDICINE

## 2019-10-30 PROCEDURE — 93005 ELECTROCARDIOGRAM TRACING: CPT

## 2019-10-30 RX ORDER — PHENTERMINE HYDROCHLORIDE 15 MG/1
15 CAPSULE ORAL EVERY MORNING
Qty: 30 CAPSULE | Refills: 2 | Status: SHIPPED | OUTPATIENT
Start: 2019-10-30 | End: 2020-01-15 | Stop reason: DRUGHIGH

## 2019-10-30 NOTE — PROGRESS NOTES
[address] [place] [address]  Dept: [phone]       Patient:  Say Dean  :      3/1/1975  MRN:      [id number]    Chief Complaint:  Patient presents wit Financial resource strain: Not on file      Food insecurity:        Worry: Not on file        Inability: Not on file      Transportation needs:        Medical: Not on file        Non-medical: Not on file    Tobacco Use      Smoking status: Former Smoker [facility]  · Reviewed and Discussed:       · Patient has a Food Journal?: yes   · Patient is reading nutrition labels? yes  · Average Caloric Intake:     · Average CHO Intake: 130  · Is patient exercising? no  · Type of exercise? rhythm  Abdomen: soft, obese, non tender  Extremities: extremities normal, atraumatic, no cyanosis or edema  Pulses: 2+ and symmetric  Skin: Skin color, texture, turgor normal. No rashes or lesions  Neurologic: Grossly normal    ASSESSMENT     HYPERCHOLEST conjunction with the above diet and exercise program. The patient will check his heart rate and blood pressure on a regular basis. He will call me if his BP goes over 140/90 or if he has palpitations or racing heart rate.  He understands that I will not chloe

## 2020-01-15 ENCOUNTER — OFFICE VISIT (OUTPATIENT)
Dept: SURGERY | Facility: CLINIC | Age: 45
End: 2020-01-15
Payer: COMMERCIAL

## 2020-01-15 VITALS
HEIGHT: 70.5 IN | HEART RATE: 95 BPM | DIASTOLIC BLOOD PRESSURE: 91 MMHG | WEIGHT: 276 LBS | BODY MASS INDEX: 39.07 KG/M2 | SYSTOLIC BLOOD PRESSURE: 134 MMHG

## 2020-01-15 DIAGNOSIS — Z51.81 ENCOUNTER FOR THERAPEUTIC DRUG MONITORING: ICD-10-CM

## 2020-01-15 DIAGNOSIS — F43.9 STRESS: ICD-10-CM

## 2020-01-15 DIAGNOSIS — E66.9 OBESITY (BMI 30-39.9): ICD-10-CM

## 2020-01-15 DIAGNOSIS — I10 ESSENTIAL HYPERTENSION: Primary | ICD-10-CM

## 2020-01-15 PROCEDURE — 99214 OFFICE O/P EST MOD 30 MIN: CPT | Performed by: INTERNAL MEDICINE

## 2020-01-15 RX ORDER — PHENTERMINE HYDROCHLORIDE 30 MG/1
30 CAPSULE ORAL EVERY MORNING
Qty: 30 CAPSULE | Refills: 2 | Status: SHIPPED | OUTPATIENT
Start: 2020-01-15 | End: 2021-03-04

## 2020-01-15 NOTE — PROGRESS NOTES
[address], [address]  Dept: [phone]       Patient:  Keily Ulloa  :      3/1/1975  MRN:      [id number]    Chief Complaint:  Patient presents wit education: Not on file      Highest education level: Not on file    Occupational History      Not on file    Social Needs      Financial resource strain: Not on file      Food insecurity:        Worry: Not on file        Inability: Not on file      Transpo Grandfather    • Cancer Paternal Grandmother         Leukemia   • Cancer Paternal Grandfather    • Dementia Paternal Grandfather        Food Journal  · Reviewed and Discussed:       · Patient has a Food Journal?: yes   · Patient is reading nutrition labels tenderness.   Lungs: clear to auscultation bilaterally  Heart: S1, S2 normal, no murmur, click, rub or gallop, regular rate and rhythm  Abdomen: soft, obese, non tender  Extremities: extremities normal, atraumatic, no cyanosis or edema  Pulses: 2+ and symme for this visit:    Essential hypertension    Stress    Encounter for therapeutic drug monitoring    Obesity (BMI 30-39. 9)          Taz Ochoa MD

## 2021-03-04 ENCOUNTER — OFFICE VISIT (OUTPATIENT)
Dept: FAMILY MEDICINE CLINIC | Facility: CLINIC | Age: 46
End: 2021-03-04
Payer: COMMERCIAL

## 2021-03-04 VITALS
WEIGHT: 289 LBS | HEIGHT: 70.5 IN | DIASTOLIC BLOOD PRESSURE: 86 MMHG | OXYGEN SATURATION: 99 % | SYSTOLIC BLOOD PRESSURE: 130 MMHG | BODY MASS INDEX: 40.91 KG/M2 | HEART RATE: 97 BPM

## 2021-03-04 DIAGNOSIS — R63.5 WEIGHT GAIN: ICD-10-CM

## 2021-03-04 DIAGNOSIS — L98.9 CRACKING SKIN: ICD-10-CM

## 2021-03-04 DIAGNOSIS — Z23 NEED FOR VACCINATION: ICD-10-CM

## 2021-03-04 DIAGNOSIS — R06.83 SNORING: ICD-10-CM

## 2021-03-04 DIAGNOSIS — Z00.01 ENCOUNTER FOR ROUTINE ADULT HEALTH EXAMINATION WITH ABNORMAL FINDINGS: Primary | ICD-10-CM

## 2021-03-04 DIAGNOSIS — Z12.11 COLON CANCER SCREENING: ICD-10-CM

## 2021-03-04 PROBLEM — E66.9 OBESITY (BMI 30-39.9): Status: RESOLVED | Noted: 2019-07-25 | Resolved: 2021-03-04

## 2021-03-04 PROBLEM — I10 ESSENTIAL HYPERTENSION: Status: RESOLVED | Noted: 2020-01-15 | Resolved: 2021-03-04

## 2021-03-04 PROCEDURE — 3075F SYST BP GE 130 - 139MM HG: CPT | Performed by: FAMILY MEDICINE

## 2021-03-04 PROCEDURE — 90686 IIV4 VACC NO PRSV 0.5 ML IM: CPT | Performed by: FAMILY MEDICINE

## 2021-03-04 PROCEDURE — 3079F DIAST BP 80-89 MM HG: CPT | Performed by: FAMILY MEDICINE

## 2021-03-04 PROCEDURE — 99396 PREV VISIT EST AGE 40-64: CPT | Performed by: FAMILY MEDICINE

## 2021-03-04 PROCEDURE — 99213 OFFICE O/P EST LOW 20 MIN: CPT | Performed by: FAMILY MEDICINE

## 2021-03-04 PROCEDURE — 90471 IMMUNIZATION ADMIN: CPT | Performed by: FAMILY MEDICINE

## 2021-03-04 PROCEDURE — 3008F BODY MASS INDEX DOCD: CPT | Performed by: FAMILY MEDICINE

## 2021-03-04 RX ORDER — VARENICLINE TARTRATE 1 MG/1
1 TABLET, FILM COATED ORAL 2 TIMES DAILY
COMMUNITY
End: 2021-03-04

## 2021-03-04 RX ORDER — PHENTERMINE HYDROCHLORIDE 15 MG/1
15 CAPSULE ORAL EVERY MORNING
Qty: 30 CAPSULE | Refills: 0 | Status: SHIPPED | OUTPATIENT
Start: 2021-03-04 | End: 2021-06-03

## 2021-03-04 RX ORDER — VARENICLINE TARTRATE 1 MG/1
1 TABLET, FILM COATED ORAL 2 TIMES DAILY
Qty: 180 TABLET | Refills: 1 | Status: SHIPPED | OUTPATIENT
Start: 2021-03-04 | End: 2021-07-23

## 2021-03-04 NOTE — PROGRESS NOTES
Demetrice Lange is a 55year old male who presents for a complete physical exam.   HPI:   Patient presents with: Annual: flu shot  Medication Request: needs refills      Concerns: Weight gain and how it might impact his long term health.  Was seeing Dr. Maryjo Hemphill 15 MG Oral Cap Take 1 capsule (15 mg total) by mouth every morning.  30 capsule 0      Past Medical History:   Diagnosis Date   • Hypertriglyceridemia    • Morbid obesity with BMI of 40.0-44.9, adult ([facility] [address]    • Obesity    • Psoriasis    • Stress       Histo grossly intact    Cholesterol, Total (mg/dL)   Date Value   02/20/2019 122   02/09/2018 150     HDL Cholesterol (mg/dL)   Date Value   02/20/2019 37 (L)   02/09/2018 43     LDL Cholesterol (mg/dL)   Date Value   02/20/2019 53   02/09/2018 89      ASSESSMEN attacks and colon  -Recommendation for yearly influenza vaccine  -Need for Tdap once as an adult and Td booster every 10 years  -Need for Zoster vaccine for patients >= 50  -Contraception: partner methods, condoms, vasectomy  -STI screening (GC/Chlamydia/H

## 2021-03-08 ENCOUNTER — LAB ENCOUNTER (OUTPATIENT)
Dept: LAB | Facility: REFERENCE LAB | Age: 46
End: 2021-03-08
Attending: FAMILY MEDICINE
Payer: COMMERCIAL

## 2021-03-08 DIAGNOSIS — Z00.01 ENCOUNTER FOR ROUTINE ADULT HEALTH EXAMINATION WITH ABNORMAL FINDINGS: ICD-10-CM

## 2021-03-08 LAB
ALBUMIN SERPL-MCNC: 3.9 G/DL (ref 3.4–5)
ALBUMIN/GLOB SERPL: 1.4 {RATIO} (ref 1–2)
ALP LIVER SERPL-CCNC: 81 U/L
ALT SERPL-CCNC: 51 U/L
ANION GAP SERPL CALC-SCNC: 4 MMOL/L (ref 0–18)
AST SERPL-CCNC: 16 U/L (ref 15–37)
BASOPHILS # BLD AUTO: 0.07 X10(3) UL (ref 0–0.2)
BASOPHILS NFR BLD AUTO: 0.8 %
BILIRUB SERPL-MCNC: 0.8 MG/DL (ref 0.1–2)
BUN BLD-MCNC: 15 MG/DL (ref 7–18)
BUN/CREAT SERPL: 17.2 (ref 10–20)
CALCIUM BLD-MCNC: 8.7 MG/DL (ref 8.5–10.1)
CHLORIDE SERPL-SCNC: 105 MMOL/L (ref 98–112)
CHOLEST SMN-MCNC: 138 MG/DL (ref ?–200)
CO2 SERPL-SCNC: 31 MMOL/L (ref 21–32)
CREAT BLD-MCNC: 0.87 MG/DL
DEPRECATED RDW RBC AUTO: 38.8 FL (ref 35.1–46.3)
EOSINOPHIL # BLD AUTO: 0.58 X10(3) UL (ref 0–0.7)
EOSINOPHIL NFR BLD AUTO: 6.7 %
ERYTHROCYTE [DISTWIDTH] IN BLOOD BY AUTOMATED COUNT: 12.2 % (ref 11–15)
EST. AVERAGE GLUCOSE BLD GHB EST-MCNC: 117 MG/DL (ref 68–126)
GLOBULIN PLAS-MCNC: 2.7 G/DL (ref 2.8–4.4)
GLUCOSE BLD-MCNC: 85 MG/DL (ref 70–99)
HBA1C MFR BLD HPLC: 5.7 % (ref ?–5.7)
HCT VFR BLD AUTO: 48.9 %
HCV AB SERPL QL IA: NONREACTIVE
HDLC SERPL-MCNC: 37 MG/DL (ref 40–59)
HGB BLD-MCNC: 16 G/DL
IMM GRANULOCYTES # BLD AUTO: 0.02 X10(3) UL (ref 0–1)
IMM GRANULOCYTES NFR BLD: 0.2 %
LDLC SERPL CALC-MCNC: 54 MG/DL (ref ?–100)
LYMPHOCYTES # BLD AUTO: 2.07 X10(3) UL (ref 1–4)
LYMPHOCYTES NFR BLD AUTO: 23.9 %
M PROTEIN MFR SERPL ELPH: 6.6 G/DL (ref 6.4–8.2)
MCH RBC QN AUTO: 28.5 PG (ref 26–34)
MCHC RBC AUTO-ENTMCNC: 32.7 G/DL (ref 31–37)
MCV RBC AUTO: 87 FL
MONOCYTES # BLD AUTO: 0.66 X10(3) UL (ref 0.1–1)
MONOCYTES NFR BLD AUTO: 7.6 %
NEUTROPHILS # BLD AUTO: 5.25 X10 (3) UL (ref 1.5–7.7)
NEUTROPHILS # BLD AUTO: 5.25 X10(3) UL (ref 1.5–7.7)
NEUTROPHILS NFR BLD AUTO: 60.8 %
NONHDLC SERPL-MCNC: 101 MG/DL (ref ?–130)
OSMOLALITY SERPL CALC.SUM OF ELEC: 290 MOSM/KG (ref 275–295)
PATIENT FASTING Y/N/NP: NO
PATIENT FASTING Y/N/NP: NO
PLATELET # BLD AUTO: 274 10(3)UL (ref 150–450)
POTASSIUM SERPL-SCNC: 3.7 MMOL/L (ref 3.5–5.1)
RBC # BLD AUTO: 5.62 X10(6)UL
SODIUM SERPL-SCNC: 140 MMOL/L (ref 136–145)
TRIGL SERPL-MCNC: 236 MG/DL (ref 30–149)
VLDLC SERPL CALC-MCNC: 47 MG/DL (ref 0–30)
WBC # BLD AUTO: 8.7 X10(3) UL (ref 4–11)

## 2021-03-08 PROCEDURE — 36415 COLL VENOUS BLD VENIPUNCTURE: CPT

## 2021-03-08 PROCEDURE — 80061 LIPID PANEL: CPT

## 2021-03-08 PROCEDURE — 83036 HEMOGLOBIN GLYCOSYLATED A1C: CPT

## 2021-03-08 PROCEDURE — 80053 COMPREHEN METABOLIC PANEL: CPT

## 2021-03-08 PROCEDURE — 85025 COMPLETE CBC W/AUTO DIFF WBC: CPT

## 2021-03-08 PROCEDURE — 86803 HEPATITIS C AB TEST: CPT

## 2021-06-03 ENCOUNTER — OFFICE VISIT (OUTPATIENT)
Dept: FAMILY MEDICINE CLINIC | Facility: CLINIC | Age: 46
End: 2021-06-03
Payer: COMMERCIAL

## 2021-06-03 VITALS
BODY MASS INDEX: 40.63 KG/M2 | HEIGHT: 70.5 IN | SYSTOLIC BLOOD PRESSURE: 130 MMHG | HEART RATE: 84 BPM | WEIGHT: 287 LBS | OXYGEN SATURATION: 98 % | DIASTOLIC BLOOD PRESSURE: 88 MMHG

## 2021-06-03 DIAGNOSIS — G89.29 CHRONIC LEFT SHOULDER PAIN: ICD-10-CM

## 2021-06-03 DIAGNOSIS — M25.512 CHRONIC LEFT SHOULDER PAIN: ICD-10-CM

## 2021-06-03 DIAGNOSIS — E66.01 MORBID OBESITY WITH BMI OF 40.0-44.9, ADULT (HCC): ICD-10-CM

## 2021-06-03 DIAGNOSIS — L28.2 PRURITIC RASH: Primary | ICD-10-CM

## 2021-06-03 PROCEDURE — 3079F DIAST BP 80-89 MM HG: CPT | Performed by: FAMILY MEDICINE

## 2021-06-03 PROCEDURE — 99214 OFFICE O/P EST MOD 30 MIN: CPT | Performed by: FAMILY MEDICINE

## 2021-06-03 PROCEDURE — 3075F SYST BP GE 130 - 139MM HG: CPT | Performed by: FAMILY MEDICINE

## 2021-06-03 PROCEDURE — 3008F BODY MASS INDEX DOCD: CPT | Performed by: FAMILY MEDICINE

## 2021-06-03 RX ORDER — PHENTERMINE HYDROCHLORIDE 15 MG/1
15 CAPSULE ORAL EVERY MORNING
Qty: 30 CAPSULE | Refills: 0 | Status: SHIPPED | OUTPATIENT
Start: 2021-06-03 | End: 2021-08-10

## 2021-06-03 RX ORDER — PREDNISONE 20 MG/1
40 TABLET ORAL DAILY
Qty: 10 TABLET | Refills: 0 | Status: SHIPPED | OUTPATIENT
Start: 2021-06-03 | End: 2021-06-08

## 2021-06-03 RX ORDER — LORATADINE 10 MG/1
10 TABLET ORAL DAILY
COMMUNITY
End: 2021-08-10

## 2021-06-03 NOTE — PROGRESS NOTES
HPI:    Patient ID: Danita Archer is a 55year old male who presents for rash, left shoulder pain, and phentermine refill. HPI  Rash:  On b/l arms, chest, underneath breasts, abdomen, and flanks.    Has had chronic itching that initially started on back Exam  Constitutional:       General: He is not in acute distress. Appearance: Normal appearance. He is obese. He is not ill-appearing, toxic-appearing or diaphoretic. HENT:      Head: Normocephalic and atraumatic.    Eyes:      Extraocular Movements: prn on more bothersome areas.   -Cold compresses & showers prn.   -Pt to call/message if persists despite above.      2. Chronic left shoulder pain  -Acute on chronic.   -Ddx includes rotator cuff tendinopathy vs biceps tendinitis vs AC joint OA  -Discussed

## 2021-07-26 RX ORDER — VARENICLINE TARTRATE 1 MG/1
1 TABLET, FILM COATED ORAL 2 TIMES DAILY
Qty: 180 TABLET | Refills: 1 | Status: SHIPPED | OUTPATIENT
Start: 2021-07-26

## 2021-07-26 NOTE — TELEPHONE ENCOUNTER
A refill request was received for:  Requested Prescriptions     Pending Prescriptions Disp Refills   • Varenicline Tartrate 1 MG Oral Tab 180 tablet 1     Sig: Take 1 tablet (1 mg total) by mouth 2 (two) times daily.      Last refill date: 03/04/2021  Qty:

## 2021-08-09 ENCOUNTER — HOSPITAL ENCOUNTER (OUTPATIENT)
Age: 46
Discharge: HOME | End: 2021-08-09
Payer: COMMERCIAL

## 2021-08-09 VITALS
SYSTOLIC BLOOD PRESSURE: 124 MMHG | DIASTOLIC BLOOD PRESSURE: 81 MMHG | HEART RATE: 84 BPM | OXYGEN SATURATION: 100 % | RESPIRATION RATE: 18 BRPM | TEMPERATURE: 98 F

## 2021-08-09 DIAGNOSIS — L08.9 ABRASION OF KNEE, INFECTED, LEFT, INITIAL ENCOUNTER: Primary | ICD-10-CM

## 2021-08-09 DIAGNOSIS — S80.212A ABRASION OF KNEE, INFECTED, LEFT, INITIAL ENCOUNTER: Primary | ICD-10-CM

## 2021-08-09 PROCEDURE — 99213 OFFICE O/P EST LOW 20 MIN: CPT | Performed by: NURSE PRACTITIONER

## 2021-08-09 RX ORDER — CLINDAMYCIN HYDROCHLORIDE 300 MG/1
300 CAPSULE ORAL 3 TIMES DAILY
Qty: 15 CAPSULE | Refills: 0 | Status: SHIPPED | OUTPATIENT
Start: 2021-08-09 | End: 2021-08-14

## 2021-08-09 RX ORDER — SULFAMETHOXAZOLE AND TRIMETHOPRIM 800; 160 MG/1; MG/1
1 TABLET ORAL 2 TIMES DAILY
Qty: 14 TABLET | Refills: 0 | Status: SHIPPED | OUTPATIENT
Start: 2021-08-09 | End: 2021-08-09

## 2021-08-09 NOTE — ED INITIAL ASSESSMENT (HPI)
Pt here s/p fall on Friday afternoon. Pt tripped on curb and sustained abrasion to left knee and left rib pain.  Denies SOB

## 2021-08-09 NOTE — ED PROVIDER NOTES
Patient Seen in: [facility]      History   Patient presents with:  Fall    Stated Complaint: FALL    HPI/Subjective:   Well-appearing 49-year-old male presents with complaints of a possible infection to his left knee after falling and scrapi Physical Exam  VS: Vital signs reviewed. 02 saturation within normal limits for this patient. General: Patient is awake and alert, oriented to person, place and time. Pt appears non-toxic. HEENT: Head is normocephalic, atraumatic.  No conjunc return/ED precautions.   Disposition and Plan     Clinical Impression:  Abrasion of knee, infected, left, initial encounter  (primary encounter diagnosis)     Disposition:  Discharge  8/9/2021  5:38 pm    Follow-up:  Jacki Rashid [address] 16-May-2017 08:49

## 2021-08-10 ENCOUNTER — OFFICE VISIT (OUTPATIENT)
Dept: SURGERY | Facility: CLINIC | Age: 46
End: 2021-08-10
Payer: COMMERCIAL

## 2021-08-10 VITALS
HEIGHT: 69.9 IN | SYSTOLIC BLOOD PRESSURE: 125 MMHG | HEART RATE: 115 BPM | DIASTOLIC BLOOD PRESSURE: 81 MMHG | OXYGEN SATURATION: 96 % | WEIGHT: 268.38 LBS | BODY MASS INDEX: 38.42 KG/M2

## 2021-08-10 DIAGNOSIS — E66.9 OBESITY (BMI 30-39.9): ICD-10-CM

## 2021-08-10 DIAGNOSIS — F43.9 STRESS: ICD-10-CM

## 2021-08-10 DIAGNOSIS — F17.200 TOBACCO DEPENDENCE: ICD-10-CM

## 2021-08-10 DIAGNOSIS — I10 ESSENTIAL HYPERTENSION: Primary | ICD-10-CM

## 2021-08-10 DIAGNOSIS — Z51.81 ENCOUNTER FOR THERAPEUTIC DRUG MONITORING: ICD-10-CM

## 2021-08-10 PROCEDURE — 3008F BODY MASS INDEX DOCD: CPT | Performed by: INTERNAL MEDICINE

## 2021-08-10 PROCEDURE — 3074F SYST BP LT 130 MM HG: CPT | Performed by: INTERNAL MEDICINE

## 2021-08-10 PROCEDURE — 99214 OFFICE O/P EST MOD 30 MIN: CPT | Performed by: INTERNAL MEDICINE

## 2021-08-10 PROCEDURE — 3079F DIAST BP 80-89 MM HG: CPT | Performed by: INTERNAL MEDICINE

## 2021-08-10 RX ORDER — BUPROPION HYDROCHLORIDE 150 MG/1
150 TABLET ORAL DAILY
Qty: 30 TABLET | Refills: 2 | Status: SHIPPED | OUTPATIENT
Start: 2021-08-10 | End: 2021-11-09 | Stop reason: DRUGHIGH

## 2021-08-10 RX ORDER — PHENTERMINE HYDROCHLORIDE 15 MG/1
15 CAPSULE ORAL EVERY MORNING
Qty: 30 CAPSULE | Refills: 2 | Status: SHIPPED | OUTPATIENT
Start: 2021-08-10 | End: 2021-11-09

## 2021-08-10 NOTE — PROGRESS NOTES
[address], [address]  Dept: [phone]       Patient:  Mick Pitts  :      3/1/1975  MRN:      [id number]    Chief Complaint:  Patient presents wit file      Years of education: Not on file      Highest education level: Not on file    Occupational History      Not on file    Tobacco Use      Smoking status: Former Smoker        Years: 20.00        Quit date: 10/15/2017        Years since quitting: 3.8 Kidney   • Heart Attack Mother    • Cancer Maternal Grandmother    • Cancer Maternal Grandfather    • Cancer Paternal Grandmother         Leukemia   • Cancer Paternal Grandfather    • Dementia Paternal Grandfather        Food Journal  · Reviewed and Discus intact. Fundi benign. Back: symmetric, no curvature. ROM normal. No CVA tenderness.   Lungs: clear to auscultation bilaterally  Heart: S1, S2 normal, no murmur, click, rub or gallop, regular rate and rhythm  Abdomen: soft, obese, non tender  Extremities: e topiramate together    Needs to quit smoking. On Chantix at times.    Will restart Wellbutrin     PHENTERMINE: will refill at 15 mg  ekg done    Diagnoses and all orders for this visit:    Essential hypertension    Stress  -     buPROPion 150 MG Oral Tablet

## 2021-08-13 ENCOUNTER — HOSPITAL ENCOUNTER (OUTPATIENT)
Age: 46
Discharge: HOME | End: 2021-08-13
Payer: COMMERCIAL

## 2021-08-13 ENCOUNTER — APPOINTMENT (OUTPATIENT)
Dept: GENERAL RADIOLOGY | Age: 46
End: 2021-08-13
Attending: EMERGENCY MEDICINE
Payer: COMMERCIAL

## 2021-08-13 VITALS
RESPIRATION RATE: 18 BRPM | TEMPERATURE: 98 F | HEART RATE: 79 BPM | OXYGEN SATURATION: 98 % | SYSTOLIC BLOOD PRESSURE: 122 MMHG | DIASTOLIC BLOOD PRESSURE: 70 MMHG

## 2021-08-13 DIAGNOSIS — S23.41XA SPRAIN OF COSTAL CARTILAGE, INITIAL ENCOUNTER: Primary | ICD-10-CM

## 2021-08-13 PROCEDURE — 99214 OFFICE O/P EST MOD 30 MIN: CPT | Performed by: EMERGENCY MEDICINE

## 2021-08-13 PROCEDURE — 71101 X-RAY EXAM UNILAT RIBS/CHEST: CPT | Performed by: EMERGENCY MEDICINE

## 2021-08-13 RX ORDER — METHOCARBAMOL 500 MG/1
500 TABLET, FILM COATED ORAL 3 TIMES DAILY PRN
Qty: 12 TABLET | Refills: 0 | Status: SHIPPED | OUTPATIENT
Start: 2021-08-13 | End: 2021-11-09 | Stop reason: ALTCHOICE

## 2021-08-13 NOTE — ED INITIAL ASSESSMENT (HPI)
Pt states was seen last week after a fall. Pt states had had some rib pain at the time but very minimal. Pt states has become worse and now having difficulty taking a deep breath.

## 2021-08-13 NOTE — ED PROVIDER NOTES
Patient Seen in: [facility]      History   Patient presents with:  Fall    Stated Complaint: cp    HPI/Subjective:   HPI    Nany Garcia is a 55year old male here for fall one week ago. Having left rib pain.  Felt as pop today, and sx got b Abdomen is soft. Musculoskeletal:      Cervical back: Normal range of motion. Skin:     Findings: No rash. Neurological:      Mental Status: He is alert and oriented to person, place, and time.    Psychiatric:         Mood and Affect: Mood normal. needed.   Qty: 12 tablet Refills: 0

## 2021-11-09 ENCOUNTER — OFFICE VISIT (OUTPATIENT)
Dept: SURGERY | Facility: CLINIC | Age: 46
End: 2021-11-09
Payer: COMMERCIAL

## 2021-11-09 VITALS
HEIGHT: 69.9 IN | DIASTOLIC BLOOD PRESSURE: 88 MMHG | SYSTOLIC BLOOD PRESSURE: 139 MMHG | WEIGHT: 264.31 LBS | BODY MASS INDEX: 37.84 KG/M2 | HEART RATE: 102 BPM | OXYGEN SATURATION: 98 %

## 2021-11-09 DIAGNOSIS — I10 ESSENTIAL HYPERTENSION: ICD-10-CM

## 2021-11-09 DIAGNOSIS — I10 ESSENTIAL HYPERTENSION: Primary | ICD-10-CM

## 2021-11-09 DIAGNOSIS — E66.9 OBESITY (BMI 30-39.9): ICD-10-CM

## 2021-11-09 DIAGNOSIS — R03.0 ELEVATED BLOOD PRESSURE READING: ICD-10-CM

## 2021-11-09 DIAGNOSIS — F43.9 STRESS: ICD-10-CM

## 2021-11-09 DIAGNOSIS — E66.01 MORBID OBESITY WITH BMI OF 40.0-44.9, ADULT (HCC): ICD-10-CM

## 2021-11-09 DIAGNOSIS — Z51.81 ENCOUNTER FOR THERAPEUTIC DRUG MONITORING: ICD-10-CM

## 2021-11-09 DIAGNOSIS — F17.200 TOBACCO DEPENDENCE: ICD-10-CM

## 2021-11-09 PROCEDURE — 99214 OFFICE O/P EST MOD 30 MIN: CPT | Performed by: INTERNAL MEDICINE

## 2021-11-09 PROCEDURE — 3008F BODY MASS INDEX DOCD: CPT | Performed by: INTERNAL MEDICINE

## 2021-11-09 PROCEDURE — 3075F SYST BP GE 130 - 139MM HG: CPT | Performed by: INTERNAL MEDICINE

## 2021-11-09 PROCEDURE — 3079F DIAST BP 80-89 MM HG: CPT | Performed by: INTERNAL MEDICINE

## 2021-11-09 RX ORDER — PHENTERMINE HYDROCHLORIDE 15 MG/1
15 CAPSULE ORAL EVERY MORNING
Qty: 30 CAPSULE | Refills: 2 | Status: SHIPPED | OUTPATIENT
Start: 2021-11-09

## 2021-11-09 RX ORDER — HYDROCHLOROTHIAZIDE 12.5 MG/1
CAPSULE, GELATIN COATED ORAL
Qty: 90 CAPSULE | Refills: 0 | OUTPATIENT
Start: 2021-11-09

## 2021-11-09 RX ORDER — BUPROPION HYDROCHLORIDE 300 MG/1
300 TABLET ORAL DAILY
Qty: 30 TABLET | Refills: 0 | Status: SHIPPED | OUTPATIENT
Start: 2021-11-09 | End: 2021-12-16

## 2021-11-09 RX ORDER — HYDROCHLOROTHIAZIDE 12.5 MG/1
12.5 CAPSULE, GELATIN COATED ORAL DAILY
Qty: 30 CAPSULE | Refills: 1 | Status: SHIPPED | OUTPATIENT
Start: 2021-11-09 | End: 2021-12-16

## 2021-11-09 RX ORDER — BUPROPION HYDROCHLORIDE 150 MG/1
150 TABLET ORAL DAILY
Qty: 30 TABLET | Refills: 2 | Status: CANCELLED | OUTPATIENT
Start: 2021-11-09

## 2021-11-09 RX ORDER — TOPIRAMATE 25 MG/1
25 TABLET ORAL EVERY EVENING
Qty: 30 TABLET | Refills: 2 | Status: SHIPPED | OUTPATIENT
Start: 2021-11-09

## 2021-11-09 NOTE — PROGRESS NOTES
[address]  Dept: [phone]       Patient:  Delilah Reynolds  :      3/1/1975  MRN:      [id number]    Chief Complaint:  Patient presents wit education: Not on file      Highest education level: Not on file    Occupational History      Not on file    Tobacco Use      Smoking status: Former Smoker        Years: 20.00        Quit date: 10/15/2017        Years since quittin.0      Smokeless tob of Places Lived in the Last Year: Not on file      Unstable Housing in the Last Year: Not on file  Surgical History:  No past surgical history on file.   Family History:    Family History   Problem Relation Age of Onset   • Heart Attack Father         MI x negative  Gastrointestinal: negative  Musculoskeletal:positive for arthralgias  Neurological: negative  Behavioral/Psych: negative  Endocrine: negative  All other systems were reviewed and are negative    Physical Exam:   General appearance: alert, appears Results   Component Value Date/Time    CHOLEST 138 03/08/2021 11:20 AM    LDL 54 03/08/2021 11:20 AM    HDL 37 (L) 03/08/2021 11:20 AM    TRIG 236 (H) 03/08/2021 11:20 AM    VLDL 47 (H) 03/08/2021 11:20 AM     Goals for next month:  1. Keep a food log.   2.

## 2021-11-29 ENCOUNTER — HOSPITAL ENCOUNTER (OUTPATIENT)
Age: 46
Discharge: HOME | End: 2021-11-29
Payer: COMMERCIAL

## 2021-11-29 PROCEDURE — 90686 IIV4 VACC NO PRSV 0.5 ML IM: CPT | Performed by: EMERGENCY MEDICINE

## 2021-11-29 PROCEDURE — 90471 IMMUNIZATION ADMIN: CPT | Performed by: EMERGENCY MEDICINE

## 2021-12-15 ENCOUNTER — HOSPITAL ENCOUNTER (OUTPATIENT)
Age: 46
Discharge: HOME | End: 2021-12-15
Payer: COMMERCIAL

## 2021-12-15 VITALS
DIASTOLIC BLOOD PRESSURE: 82 MMHG | RESPIRATION RATE: 16 BRPM | OXYGEN SATURATION: 98 % | SYSTOLIC BLOOD PRESSURE: 123 MMHG | TEMPERATURE: 98 F | BODY MASS INDEX: 35.79 KG/M2 | HEIGHT: 70 IN | WEIGHT: 250 LBS | HEART RATE: 95 BPM

## 2021-12-15 DIAGNOSIS — R03.0 ELEVATED BLOOD PRESSURE READING: ICD-10-CM

## 2021-12-15 DIAGNOSIS — F17.200 TOBACCO DEPENDENCE: ICD-10-CM

## 2021-12-15 DIAGNOSIS — Z11.52 ENCOUNTER FOR SCREENING FOR COVID-19: Primary | ICD-10-CM

## 2021-12-15 DIAGNOSIS — F43.9 STRESS: ICD-10-CM

## 2021-12-15 DIAGNOSIS — E66.01 MORBID OBESITY WITH BMI OF 40.0-44.9, ADULT (HCC): ICD-10-CM

## 2021-12-15 DIAGNOSIS — I10 ESSENTIAL HYPERTENSION: ICD-10-CM

## 2021-12-15 PROCEDURE — U0002 COVID-19 LAB TEST NON-CDC: HCPCS | Performed by: NURSE PRACTITIONER

## 2021-12-15 PROCEDURE — 99212 OFFICE O/P EST SF 10 MIN: CPT | Performed by: NURSE PRACTITIONER

## 2021-12-16 RX ORDER — BUPROPION HYDROCHLORIDE 300 MG/1
TABLET ORAL
Qty: 30 TABLET | Refills: 0 | Status: SHIPPED | OUTPATIENT
Start: 2021-12-16

## 2021-12-16 RX ORDER — HYDROCHLOROTHIAZIDE 12.5 MG/1
CAPSULE, GELATIN COATED ORAL
Qty: 90 CAPSULE | Refills: 0 | Status: SHIPPED | OUTPATIENT
Start: 2021-12-16

## 2021-12-16 NOTE — ED PROVIDER NOTES
Patient presents with:  Testing      HPI:     Mendel Morin is a 55year old male who presents for a Covid test.  He had a possible exposure. He is fully vaccinated. He denies any symptoms or complaints. He appears well and nontoxic.   [facility]  Social Connections: Not on file  Intimate Partner Violence: Not on file  Housing Stability: Not on file     ROS:   Positive for stated complaint: COVID test, exposure  All other systems reviewed and negative except as noted above.   Constitutional and

## 2021-12-20 DIAGNOSIS — F43.9 STRESS: ICD-10-CM

## 2021-12-20 DIAGNOSIS — F17.200 TOBACCO DEPENDENCE: ICD-10-CM

## 2021-12-21 RX ORDER — BUPROPION HYDROCHLORIDE 150 MG/1
TABLET ORAL
Qty: 30 TABLET | Refills: 2 | OUTPATIENT
Start: 2021-12-21

## 2022-02-14 RX ORDER — BUPROPION HYDROCHLORIDE 300 MG/1
TABLET ORAL
Qty: 30 TABLET | Refills: 0 | Status: SHIPPED | OUTPATIENT
Start: 2022-02-14 | End: 2022-02-17

## 2022-02-17 ENCOUNTER — LAB ENCOUNTER (OUTPATIENT)
Dept: LAB | Facility: HOSPITAL | Age: 47
End: 2022-02-17
Attending: INTERNAL MEDICINE
Payer: COMMERCIAL

## 2022-02-17 DIAGNOSIS — F17.200 TOBACCO DEPENDENCE: ICD-10-CM

## 2022-02-17 DIAGNOSIS — E66.9 OBESITY (BMI 30-39.9): ICD-10-CM

## 2022-02-17 DIAGNOSIS — F43.9 STRESS: ICD-10-CM

## 2022-02-17 DIAGNOSIS — Z51.81 ENCOUNTER FOR THERAPEUTIC DRUG MONITORING: ICD-10-CM

## 2022-02-17 DIAGNOSIS — I10 ESSENTIAL HYPERTENSION: ICD-10-CM

## 2022-02-17 PROCEDURE — 93010 ELECTROCARDIOGRAM REPORT: CPT | Performed by: INTERNAL MEDICINE

## 2022-02-17 PROCEDURE — 93005 ELECTROCARDIOGRAM TRACING: CPT

## 2022-03-21 ENCOUNTER — LABORATORY ENCOUNTER (OUTPATIENT)
Dept: LAB | Facility: REFERENCE LAB | Age: 47
End: 2022-03-21
Attending: FAMILY MEDICINE
Payer: COMMERCIAL

## 2022-03-21 ENCOUNTER — OFFICE VISIT (OUTPATIENT)
Dept: FAMILY MEDICINE CLINIC | Facility: CLINIC | Age: 47
End: 2022-03-21
Payer: COMMERCIAL

## 2022-03-21 ENCOUNTER — MED REC SCAN ONLY (OUTPATIENT)
Dept: FAMILY MEDICINE CLINIC | Facility: CLINIC | Age: 47
End: 2022-03-21

## 2022-03-21 VITALS
HEART RATE: 82 BPM | BODY MASS INDEX: 35.36 KG/M2 | OXYGEN SATURATION: 98 % | WEIGHT: 247 LBS | HEIGHT: 70 IN | SYSTOLIC BLOOD PRESSURE: 102 MMHG | DIASTOLIC BLOOD PRESSURE: 60 MMHG

## 2022-03-21 DIAGNOSIS — Z12.11 COLON CANCER SCREENING: ICD-10-CM

## 2022-03-21 DIAGNOSIS — Z00.00 ENCOUNTER FOR ROUTINE ADULT HEALTH EXAMINATION WITHOUT ABNORMAL FINDINGS: ICD-10-CM

## 2022-03-21 DIAGNOSIS — Z00.00 ENCOUNTER FOR ROUTINE ADULT HEALTH EXAMINATION WITHOUT ABNORMAL FINDINGS: Primary | ICD-10-CM

## 2022-03-21 PROBLEM — F17.200 TOBACCO DEPENDENCE: Status: RESOLVED | Noted: 2021-11-09 | Resolved: 2022-03-21

## 2022-03-21 PROBLEM — R03.0 ELEVATED BLOOD PRESSURE READING: Status: RESOLVED | Noted: 2019-06-10 | Resolved: 2022-03-21

## 2022-03-21 PROBLEM — E66.01 MORBID OBESITY WITH BMI OF 40.0-44.9, ADULT (HCC): Status: RESOLVED | Noted: 2019-06-10 | Resolved: 2022-03-21

## 2022-03-21 PROBLEM — I10 ESSENTIAL HYPERTENSION: Status: RESOLVED | Noted: 2020-01-15 | Resolved: 2022-03-21

## 2022-03-21 LAB
ALBUMIN SERPL-MCNC: 4.4 G/DL (ref 3.4–5)
ALBUMIN/GLOB SERPL: 1.9 {RATIO} (ref 1–2)
ALT SERPL-CCNC: 29 U/L
AST SERPL-CCNC: 15 U/L (ref 15–37)
BASOPHILS NFR BLD AUTO: 0.8 %
BILIRUB SERPL-MCNC: 0.8 MG/DL (ref 0.1–2)
BUN BLD-MCNC: 15 MG/DL (ref 7–18)
BUN/CREAT SERPL: 13.9 (ref 10–20)
CALCIUM BLD-MCNC: 9.6 MG/DL (ref 8.5–10.1)
CHLORIDE SERPL-SCNC: 102 MMOL/L (ref 98–112)
CHOLEST SERPL-MCNC: 161 MG/DL (ref ?–200)
CO2 SERPL-SCNC: 27 MMOL/L (ref 21–32)
CREAT BLD-MCNC: 1.08 MG/DL
DEPRECATED RDW RBC AUTO: 41.9 FL (ref 35.1–46.3)
EOSINOPHIL # BLD AUTO: 0.53 X10(3) UL (ref 0–0.7)
EOSINOPHIL NFR BLD AUTO: 6.6 %
ERYTHROCYTE [DISTWIDTH] IN BLOOD BY AUTOMATED COUNT: 12.9 % (ref 11–15)
EST. AVERAGE GLUCOSE BLD GHB EST-MCNC: 105 MG/DL (ref 68–126)
FASTING PATIENT LIPID ANSWER: NO
FASTING STATUS PATIENT QL REPORTED: NO
GLOBULIN PLAS-MCNC: 2.3 G/DL (ref 2.8–4.4)
GLUCOSE BLD-MCNC: 86 MG/DL (ref 70–99)
HBA1C MFR BLD: 5.3 % (ref ?–5.7)
HCT VFR BLD AUTO: 49.3 %
HDLC SERPL-MCNC: 39 MG/DL (ref 40–59)
HGB BLD-MCNC: 16 G/DL
IMM GRANULOCYTES # BLD AUTO: 0.02 X10(3) UL (ref 0–1)
IMM GRANULOCYTES NFR BLD: 0.3 %
LDLC SERPL CALC-MCNC: 94 MG/DL (ref ?–100)
LYMPHOCYTES # BLD AUTO: 1.67 X10(3) UL (ref 1–4)
LYMPHOCYTES NFR BLD AUTO: 20.9 %
MCH RBC QN AUTO: 28.6 PG (ref 26–34)
MCHC RBC AUTO-ENTMCNC: 32.5 G/DL (ref 31–37)
MCV RBC AUTO: 88 FL
MONOCYTES # BLD AUTO: 0.6 X10(3) UL (ref 0.1–1)
MONOCYTES NFR BLD AUTO: 7.5 %
NEUTROPHILS # BLD AUTO: 5.11 X10 (3) UL (ref 1.5–7.7)
NEUTROPHILS # BLD AUTO: 5.11 X10(3) UL (ref 1.5–7.7)
NEUTROPHILS NFR BLD AUTO: 63.9 %
NONHDLC SERPL-MCNC: 122 MG/DL (ref ?–130)
OSMOLALITY SERPL CALC.SUM OF ELEC: 284 MOSM/KG (ref 275–295)
PLATELET # BLD AUTO: 301 10(3)UL (ref 150–450)
POTASSIUM SERPL-SCNC: 3.7 MMOL/L (ref 3.5–5.1)
PROT SERPL-MCNC: 6.7 G/DL (ref 6.4–8.2)
RBC # BLD AUTO: 5.6 X10(6)UL
SODIUM SERPL-SCNC: 137 MMOL/L (ref 136–145)
TRIGL SERPL-MCNC: 160 MG/DL (ref 30–149)
VLDLC SERPL CALC-MCNC: 26 MG/DL (ref 0–30)
WBC # BLD AUTO: 8 X10(3) UL (ref 4–11)

## 2022-03-21 PROCEDURE — 80061 LIPID PANEL: CPT

## 2022-03-21 PROCEDURE — 83036 HEMOGLOBIN GLYCOSYLATED A1C: CPT

## 2022-03-21 PROCEDURE — 3008F BODY MASS INDEX DOCD: CPT | Performed by: FAMILY MEDICINE

## 2022-03-21 PROCEDURE — 3078F DIAST BP <80 MM HG: CPT | Performed by: FAMILY MEDICINE

## 2022-03-21 PROCEDURE — 3074F SYST BP LT 130 MM HG: CPT | Performed by: FAMILY MEDICINE

## 2022-03-21 PROCEDURE — 36415 COLL VENOUS BLD VENIPUNCTURE: CPT

## 2022-03-21 PROCEDURE — 99396 PREV VISIT EST AGE 40-64: CPT | Performed by: FAMILY MEDICINE

## 2022-03-21 PROCEDURE — 85025 COMPLETE CBC W/AUTO DIFF WBC: CPT

## 2022-03-21 PROCEDURE — 80053 COMPREHEN METABOLIC PANEL: CPT

## 2022-04-04 LAB
AMB EXT COLOGUARD RESULT: NEGATIVE
AMB EXT COLOGUARD RESULT: NEGATIVE

## 2022-04-14 ENCOUNTER — PATIENT MESSAGE (OUTPATIENT)
Dept: FAMILY MEDICINE CLINIC | Facility: CLINIC | Age: 47
End: 2022-04-14

## 2022-04-18 ENCOUNTER — MED REC SCAN ONLY (OUTPATIENT)
Dept: FAMILY MEDICINE CLINIC | Facility: CLINIC | Age: 47
End: 2022-04-18

## 2022-04-20 ENCOUNTER — HOSPITAL ENCOUNTER (OUTPATIENT)
Dept: GENERAL RADIOLOGY | Age: 47
Discharge: HOME | End: 2022-04-20
Attending: PHYSICAL MEDICINE & REHABILITATION
Payer: COMMERCIAL

## 2022-04-20 ENCOUNTER — OFFICE VISIT (OUTPATIENT)
Dept: PHYSICAL MEDICINE AND REHAB | Facility: CLINIC | Age: 47
End: 2022-04-20
Payer: COMMERCIAL

## 2022-04-20 VITALS
HEIGHT: 70 IN | DIASTOLIC BLOOD PRESSURE: 78 MMHG | SYSTOLIC BLOOD PRESSURE: 120 MMHG | BODY MASS INDEX: 35.36 KG/M2 | WEIGHT: 247 LBS

## 2022-04-20 DIAGNOSIS — M75.22 BICEPS TENDINITIS OF LEFT SHOULDER: ICD-10-CM

## 2022-04-20 DIAGNOSIS — M67.912 TENDINOPATHY OF LEFT ROTATOR CUFF: ICD-10-CM

## 2022-04-20 DIAGNOSIS — M67.912 TENDINOPATHY OF LEFT ROTATOR CUFF: Primary | ICD-10-CM

## 2022-04-20 PROCEDURE — 3008F BODY MASS INDEX DOCD: CPT | Performed by: PHYSICAL MEDICINE & REHABILITATION

## 2022-04-20 PROCEDURE — 3074F SYST BP LT 130 MM HG: CPT | Performed by: PHYSICAL MEDICINE & REHABILITATION

## 2022-04-20 PROCEDURE — 99204 OFFICE O/P NEW MOD 45 MIN: CPT | Performed by: PHYSICAL MEDICINE & REHABILITATION

## 2022-04-20 PROCEDURE — 73030 X-RAY EXAM OF SHOULDER: CPT | Performed by: PHYSICAL MEDICINE & REHABILITATION

## 2022-04-20 PROCEDURE — 3078F DIAST BP <80 MM HG: CPT | Performed by: PHYSICAL MEDICINE & REHABILITATION

## 2022-04-20 RX ORDER — MELOXICAM 15 MG/1
15 TABLET ORAL DAILY
Qty: 14 TABLET | Refills: 0 | Status: SHIPPED | OUTPATIENT
Start: 2022-04-20 | End: 2022-05-04

## 2022-04-20 NOTE — PATIENT INSTRUCTIONS
-Start physical therapy and home exercises  -Mobic daily for the next 7-10 days and then as needed  -Ice 2-3 x daily for 10-15 minutes  -Xray on the way out today  -Please stop the medication if you have any side effects and call the office if you have any questions or concerns  -If no better will consider Ultrasound guided injection  -Follow up in 4 weeks

## 2022-05-18 ENCOUNTER — OFFICE VISIT (OUTPATIENT)
Dept: SURGERY | Facility: CLINIC | Age: 47
End: 2022-05-18
Payer: COMMERCIAL

## 2022-05-18 VITALS
HEART RATE: 67 BPM | HEIGHT: 69.9 IN | SYSTOLIC BLOOD PRESSURE: 126 MMHG | WEIGHT: 250.5 LBS | OXYGEN SATURATION: 98 % | DIASTOLIC BLOOD PRESSURE: 79 MMHG | BODY MASS INDEX: 35.86 KG/M2

## 2022-05-18 DIAGNOSIS — I10 ESSENTIAL HYPERTENSION: Primary | ICD-10-CM

## 2022-05-18 DIAGNOSIS — Z51.81 ENCOUNTER FOR THERAPEUTIC DRUG MONITORING: ICD-10-CM

## 2022-05-18 DIAGNOSIS — F43.9 STRESS: ICD-10-CM

## 2022-05-18 DIAGNOSIS — E66.9 OBESITY (BMI 30-39.9): ICD-10-CM

## 2022-05-18 PROCEDURE — 99214 OFFICE O/P EST MOD 30 MIN: CPT | Performed by: INTERNAL MEDICINE

## 2022-05-18 PROCEDURE — 3008F BODY MASS INDEX DOCD: CPT | Performed by: INTERNAL MEDICINE

## 2022-05-18 PROCEDURE — 3074F SYST BP LT 130 MM HG: CPT | Performed by: INTERNAL MEDICINE

## 2022-05-18 PROCEDURE — 3078F DIAST BP <80 MM HG: CPT | Performed by: INTERNAL MEDICINE

## 2022-05-18 RX ORDER — PHENTERMINE HYDROCHLORIDE 15 MG/1
15 CAPSULE ORAL EVERY MORNING
Qty: 30 CAPSULE | Refills: 2 | Status: SHIPPED | OUTPATIENT
Start: 2022-05-18

## 2022-05-25 ENCOUNTER — OFFICE VISIT (OUTPATIENT)
Dept: PHYSICAL MEDICINE AND REHAB | Facility: CLINIC | Age: 47
End: 2022-05-25
Payer: COMMERCIAL

## 2022-05-25 VITALS
HEIGHT: 69.9 IN | DIASTOLIC BLOOD PRESSURE: 85 MMHG | SYSTOLIC BLOOD PRESSURE: 123 MMHG | HEART RATE: 80 BPM | WEIGHT: 250 LBS | BODY MASS INDEX: 35.79 KG/M2 | OXYGEN SATURATION: 98 %

## 2022-05-25 DIAGNOSIS — M67.912 TENDINOPATHY OF LEFT ROTATOR CUFF: Primary | ICD-10-CM

## 2022-05-25 DIAGNOSIS — M75.22 BICEPS TENDINITIS OF LEFT SHOULDER: ICD-10-CM

## 2022-05-25 PROCEDURE — 3079F DIAST BP 80-89 MM HG: CPT | Performed by: PHYSICAL MEDICINE & REHABILITATION

## 2022-05-25 PROCEDURE — 3008F BODY MASS INDEX DOCD: CPT | Performed by: PHYSICAL MEDICINE & REHABILITATION

## 2022-05-25 PROCEDURE — 3074F SYST BP LT 130 MM HG: CPT | Performed by: PHYSICAL MEDICINE & REHABILITATION

## 2022-05-25 PROCEDURE — 99214 OFFICE O/P EST MOD 30 MIN: CPT | Performed by: PHYSICAL MEDICINE & REHABILITATION

## 2022-12-21 ENCOUNTER — OFFICE VISIT (OUTPATIENT)
Dept: SURGERY | Facility: CLINIC | Age: 47
End: 2022-12-21
Payer: COMMERCIAL

## 2022-12-21 VITALS
OXYGEN SATURATION: 100 % | HEART RATE: 88 BPM | HEIGHT: 69.9 IN | BODY MASS INDEX: 39.42 KG/M2 | WEIGHT: 275.38 LBS | DIASTOLIC BLOOD PRESSURE: 83 MMHG | SYSTOLIC BLOOD PRESSURE: 132 MMHG

## 2022-12-21 DIAGNOSIS — E66.9 OBESITY (BMI 30-39.9): ICD-10-CM

## 2022-12-21 DIAGNOSIS — I10 ESSENTIAL HYPERTENSION: Primary | ICD-10-CM

## 2022-12-21 DIAGNOSIS — F43.9 STRESS: ICD-10-CM

## 2022-12-21 DIAGNOSIS — Z51.81 ENCOUNTER FOR THERAPEUTIC DRUG MONITORING: ICD-10-CM

## 2022-12-21 PROCEDURE — 3008F BODY MASS INDEX DOCD: CPT | Performed by: INTERNAL MEDICINE

## 2022-12-21 PROCEDURE — 3075F SYST BP GE 130 - 139MM HG: CPT | Performed by: INTERNAL MEDICINE

## 2022-12-21 PROCEDURE — 99214 OFFICE O/P EST MOD 30 MIN: CPT | Performed by: INTERNAL MEDICINE

## 2022-12-21 PROCEDURE — 3079F DIAST BP 80-89 MM HG: CPT | Performed by: INTERNAL MEDICINE

## 2022-12-21 RX ORDER — BUPROPION HYDROCHLORIDE 150 MG/1
150 TABLET ORAL DAILY
Qty: 90 TABLET | Refills: 1 | Status: SHIPPED | OUTPATIENT
Start: 2022-12-21 | End: 2023-03-21

## 2022-12-21 RX ORDER — PHENTERMINE HYDROCHLORIDE 37.5 MG/1
37.5 TABLET ORAL
Qty: 30 TABLET | Refills: 2 | Status: SHIPPED | OUTPATIENT
Start: 2022-12-21

## 2022-12-30 ENCOUNTER — OFFICE VISIT (OUTPATIENT)
Dept: DERMATOLOGY CLINIC | Facility: CLINIC | Age: 47
End: 2022-12-30
Payer: COMMERCIAL

## 2022-12-30 DIAGNOSIS — L30.9 DERMATITIS: Primary | ICD-10-CM

## 2022-12-30 PROCEDURE — 99203 OFFICE O/P NEW LOW 30 MIN: CPT | Performed by: PHYSICIAN ASSISTANT

## 2023-03-09 DIAGNOSIS — E66.01 MORBID OBESITY WITH BMI OF 40.0-44.9, ADULT (HCC): ICD-10-CM

## 2023-03-09 RX ORDER — TOPIRAMATE 25 MG/1
TABLET ORAL
Qty: 180 TABLET | Refills: 1 | Status: SHIPPED | OUTPATIENT
Start: 2023-03-09

## 2023-04-05 ENCOUNTER — OFFICE VISIT (OUTPATIENT)
Dept: SURGERY | Facility: CLINIC | Age: 48
End: 2023-04-05
Payer: COMMERCIAL

## 2023-04-05 VITALS
WEIGHT: 278 LBS | BODY MASS INDEX: 39.8 KG/M2 | HEIGHT: 69.9 IN | DIASTOLIC BLOOD PRESSURE: 70 MMHG | HEART RATE: 103 BPM | SYSTOLIC BLOOD PRESSURE: 112 MMHG | OXYGEN SATURATION: 98 %

## 2023-04-05 DIAGNOSIS — E66.01 MORBID OBESITY WITH BMI OF 40.0-44.9, ADULT (HCC): ICD-10-CM

## 2023-04-05 DIAGNOSIS — Z51.81 ENCOUNTER FOR THERAPEUTIC DRUG MONITORING: ICD-10-CM

## 2023-04-05 DIAGNOSIS — F43.9 STRESS: ICD-10-CM

## 2023-04-05 DIAGNOSIS — I10 ESSENTIAL HYPERTENSION: Primary | ICD-10-CM

## 2023-04-05 PROCEDURE — 3078F DIAST BP <80 MM HG: CPT | Performed by: INTERNAL MEDICINE

## 2023-04-05 PROCEDURE — 99214 OFFICE O/P EST MOD 30 MIN: CPT | Performed by: INTERNAL MEDICINE

## 2023-04-05 PROCEDURE — 3074F SYST BP LT 130 MM HG: CPT | Performed by: INTERNAL MEDICINE

## 2023-04-05 PROCEDURE — 3008F BODY MASS INDEX DOCD: CPT | Performed by: INTERNAL MEDICINE

## 2023-04-05 RX ORDER — BUPROPION HYDROCHLORIDE 150 MG/1
TABLET ORAL
COMMUNITY
Start: 2023-03-21

## 2023-04-05 RX ORDER — BUPROPION HYDROCHLORIDE 300 MG/1
TABLET ORAL
COMMUNITY
Start: 2023-01-04

## 2023-04-05 RX ORDER — PHENTERMINE HYDROCHLORIDE 37.5 MG/1
37.5 TABLET ORAL
Qty: 30 TABLET | Refills: 2 | Status: SHIPPED | OUTPATIENT
Start: 2023-04-05

## 2023-06-22 RX ORDER — BUPROPION HYDROCHLORIDE 150 MG/1
TABLET ORAL
Qty: 90 TABLET | Refills: 0 | Status: SHIPPED | OUTPATIENT
Start: 2023-06-22

## 2023-08-09 ENCOUNTER — OFFICE VISIT (OUTPATIENT)
Dept: SURGERY | Facility: CLINIC | Age: 48
End: 2023-08-09
Payer: COMMERCIAL

## 2023-08-09 VITALS
DIASTOLIC BLOOD PRESSURE: 80 MMHG | HEART RATE: 73 BPM | WEIGHT: 275 LBS | OXYGEN SATURATION: 98 % | HEIGHT: 69.9 IN | SYSTOLIC BLOOD PRESSURE: 126 MMHG | BODY MASS INDEX: 39.37 KG/M2

## 2023-08-09 DIAGNOSIS — F43.9 STRESS: ICD-10-CM

## 2023-08-09 DIAGNOSIS — E66.9 OBESITY (BMI 30-39.9): ICD-10-CM

## 2023-08-09 DIAGNOSIS — Z51.81 ENCOUNTER FOR THERAPEUTIC DRUG MONITORING: ICD-10-CM

## 2023-08-09 DIAGNOSIS — I10 ESSENTIAL HYPERTENSION: Primary | ICD-10-CM

## 2023-08-09 PROCEDURE — 3074F SYST BP LT 130 MM HG: CPT | Performed by: INTERNAL MEDICINE

## 2023-08-09 PROCEDURE — 3008F BODY MASS INDEX DOCD: CPT | Performed by: INTERNAL MEDICINE

## 2023-08-09 PROCEDURE — 99214 OFFICE O/P EST MOD 30 MIN: CPT | Performed by: INTERNAL MEDICINE

## 2023-08-09 PROCEDURE — 3079F DIAST BP 80-89 MM HG: CPT | Performed by: INTERNAL MEDICINE

## 2023-08-09 RX ORDER — PHENTERMINE HYDROCHLORIDE 37.5 MG/1
37.5 TABLET ORAL
Qty: 30 TABLET | Refills: 2 | Status: SHIPPED | OUTPATIENT
Start: 2023-08-09

## 2023-08-09 RX ORDER — FLUOXETINE HYDROCHLORIDE 20 MG/1
20 CAPSULE ORAL DAILY
COMMUNITY
Start: 2023-08-01

## 2023-09-19 RX ORDER — BUPROPION HYDROCHLORIDE 150 MG/1
TABLET ORAL
Qty: 90 TABLET | Refills: 0 | Status: SHIPPED | OUTPATIENT
Start: 2023-09-19

## 2023-11-15 NOTE — TELEPHONE ENCOUNTER
Refill Request for medication(s): triamcinolone 0.1 % External Ointment     Last Office Visit: 12/2022. Last Refill: 10/2023    Pharmacy, Dosage verified:     Condition Update (if applicable): msg sent    Rx pended and sent to provider for approval, please advise. Thank You!

## 2023-11-15 NOTE — TELEPHONE ENCOUNTER
Refill Request for medication(s):     Last Office Visit: 12/30/22    Last Refill: 12/30/22    Pharmacy, Dosage verified: yes    Condition Update (if applicable):     Rx pended and sent to provider for approval, please advise. Thank You!

## 2023-11-16 RX ORDER — TRIAMCINOLONE ACETONIDE 1 MG/G
1 OINTMENT TOPICAL 2 TIMES DAILY
Qty: 453.6 G | Refills: 1 | OUTPATIENT
Start: 2023-11-16

## 2023-12-04 RX ORDER — TRIAMCINOLONE ACETONIDE 1 MG/G
1 OINTMENT TOPICAL 2 TIMES DAILY
Qty: 453.6 G | Refills: 1 | OUTPATIENT
Start: 2023-12-04

## 2023-12-04 NOTE — TELEPHONE ENCOUNTER
Msg seen by pt, pt has not responded - will deny at this point, seems like this was generated by pharmacy

## 2023-12-18 ENCOUNTER — OFFICE VISIT (OUTPATIENT)
Dept: FAMILY MEDICINE CLINIC | Facility: CLINIC | Age: 48
End: 2023-12-18
Payer: COMMERCIAL

## 2023-12-18 VITALS
SYSTOLIC BLOOD PRESSURE: 148 MMHG | RESPIRATION RATE: 18 BRPM | HEART RATE: 90 BPM | OXYGEN SATURATION: 98 % | BODY MASS INDEX: 39.37 KG/M2 | TEMPERATURE: 98 F | HEIGHT: 70 IN | WEIGHT: 275 LBS | DIASTOLIC BLOOD PRESSURE: 90 MMHG

## 2023-12-18 DIAGNOSIS — R03.0 ELEVATED BLOOD PRESSURE READING: ICD-10-CM

## 2023-12-18 DIAGNOSIS — J01.00 ACUTE NON-RECURRENT MAXILLARY SINUSITIS: Primary | ICD-10-CM

## 2023-12-18 RX ORDER — DOXYCYCLINE HYCLATE 100 MG/1
100 CAPSULE ORAL 2 TIMES DAILY
Qty: 10 CAPSULE | Refills: 0 | Status: SHIPPED | OUTPATIENT
Start: 2023-12-18 | End: 2023-12-23

## 2023-12-26 RX ORDER — BUPROPION HYDROCHLORIDE 150 MG/1
150 TABLET ORAL DAILY
Qty: 90 TABLET | Refills: 0 | Status: SHIPPED | OUTPATIENT
Start: 2023-12-26

## 2024-01-02 ENCOUNTER — OFFICE VISIT (OUTPATIENT)
Facility: CLINIC | Age: 49
End: 2024-01-02
Payer: COMMERCIAL

## 2024-01-02 ENCOUNTER — LAB ENCOUNTER (OUTPATIENT)
Dept: LAB | Facility: REFERENCE LAB | Age: 49
End: 2024-01-02
Attending: FAMILY MEDICINE
Payer: COMMERCIAL

## 2024-01-02 VITALS
OXYGEN SATURATION: 97 % | DIASTOLIC BLOOD PRESSURE: 70 MMHG | BODY MASS INDEX: 38.8 KG/M2 | SYSTOLIC BLOOD PRESSURE: 124 MMHG | HEART RATE: 80 BPM | WEIGHT: 271 LBS | HEIGHT: 70 IN | TEMPERATURE: 97 F

## 2024-01-02 DIAGNOSIS — Z12.5 PROSTATE CANCER SCREENING: ICD-10-CM

## 2024-01-02 DIAGNOSIS — Z00.00 ENCOUNTER FOR ROUTINE ADULT HEALTH EXAMINATION WITHOUT ABNORMAL FINDINGS: Primary | ICD-10-CM

## 2024-01-02 DIAGNOSIS — Z00.00 ENCOUNTER FOR ROUTINE ADULT HEALTH EXAMINATION WITHOUT ABNORMAL FINDINGS: ICD-10-CM

## 2024-01-02 DIAGNOSIS — L40.9 PSORIASIS: ICD-10-CM

## 2024-01-02 LAB
ALBUMIN SERPL-MCNC: 4.4 G/DL (ref 3.2–4.8)
ALBUMIN/GLOB SERPL: 1.8 {RATIO} (ref 1–2)
ALP LIVER SERPL-CCNC: 57 U/L
ALT SERPL-CCNC: 52 U/L
ANION GAP SERPL CALC-SCNC: 4 MMOL/L (ref 0–18)
AST SERPL-CCNC: 23 U/L (ref ?–34)
BASOPHILS # BLD AUTO: 0.07 X10(3) UL (ref 0–0.2)
BASOPHILS NFR BLD AUTO: 0.8 %
BILIRUB SERPL-MCNC: 1 MG/DL (ref 0.3–1.2)
BUN BLD-MCNC: 11 MG/DL (ref 9–23)
BUN/CREAT SERPL: 11.8 (ref 10–20)
CALCIUM BLD-MCNC: 9.3 MG/DL (ref 8.7–10.4)
CHLORIDE SERPL-SCNC: 106 MMOL/L (ref 98–112)
CHOLEST SERPL-MCNC: 156 MG/DL (ref ?–200)
CO2 SERPL-SCNC: 30 MMOL/L (ref 21–32)
COMPLEXED PSA SERPL-MCNC: 1.3 NG/ML (ref ?–4)
CREAT BLD-MCNC: 0.93 MG/DL
DEPRECATED RDW RBC AUTO: 38.3 FL (ref 35.1–46.3)
EGFRCR SERPLBLD CKD-EPI 2021: 101 ML/MIN/1.73M2 (ref 60–?)
EOSINOPHIL # BLD AUTO: 0.62 X10(3) UL (ref 0–0.7)
EOSINOPHIL NFR BLD AUTO: 7.4 %
ERYTHROCYTE [DISTWIDTH] IN BLOOD BY AUTOMATED COUNT: 12 % (ref 11–15)
EST. AVERAGE GLUCOSE BLD GHB EST-MCNC: 117 MG/DL (ref 68–126)
FASTING PATIENT LIPID ANSWER: YES
FASTING STATUS PATIENT QL REPORTED: YES
GLOBULIN PLAS-MCNC: 2.4 G/DL (ref 2.8–4.4)
GLUCOSE BLD-MCNC: 103 MG/DL (ref 70–99)
HBA1C MFR BLD: 5.7 % (ref ?–5.7)
HCT VFR BLD AUTO: 49.5 %
HDLC SERPL-MCNC: 41 MG/DL (ref 40–59)
HGB BLD-MCNC: 16.8 G/DL
IMM GRANULOCYTES # BLD AUTO: 0.02 X10(3) UL (ref 0–1)
IMM GRANULOCYTES NFR BLD: 0.2 %
LDLC SERPL CALC-MCNC: 91 MG/DL (ref ?–100)
LYMPHOCYTES # BLD AUTO: 2 X10(3) UL (ref 1–4)
LYMPHOCYTES NFR BLD AUTO: 23.8 %
MCH RBC QN AUTO: 29.4 PG (ref 26–34)
MCHC RBC AUTO-ENTMCNC: 33.9 G/DL (ref 31–37)
MCV RBC AUTO: 86.7 FL
MONOCYTES # BLD AUTO: 0.54 X10(3) UL (ref 0.1–1)
MONOCYTES NFR BLD AUTO: 6.4 %
NEUTROPHILS # BLD AUTO: 5.15 X10 (3) UL (ref 1.5–7.7)
NEUTROPHILS # BLD AUTO: 5.15 X10(3) UL (ref 1.5–7.7)
NEUTROPHILS NFR BLD AUTO: 61.4 %
NONHDLC SERPL-MCNC: 115 MG/DL (ref ?–130)
OSMOLALITY SERPL CALC.SUM OF ELEC: 290 MOSM/KG (ref 275–295)
PLATELET # BLD AUTO: 276 10(3)UL (ref 150–450)
POTASSIUM SERPL-SCNC: 3.7 MMOL/L (ref 3.5–5.1)
PROT SERPL-MCNC: 6.8 G/DL (ref 5.7–8.2)
RBC # BLD AUTO: 5.71 X10(6)UL
SODIUM SERPL-SCNC: 140 MMOL/L (ref 136–145)
TRIGL SERPL-MCNC: 136 MG/DL (ref 30–149)
VLDLC SERPL CALC-MCNC: 22 MG/DL (ref 0–30)
WBC # BLD AUTO: 8.4 X10(3) UL (ref 4–11)

## 2024-01-02 PROCEDURE — 3008F BODY MASS INDEX DOCD: CPT | Performed by: FAMILY MEDICINE

## 2024-01-02 PROCEDURE — 85025 COMPLETE CBC W/AUTO DIFF WBC: CPT

## 2024-01-02 PROCEDURE — 99396 PREV VISIT EST AGE 40-64: CPT | Performed by: FAMILY MEDICINE

## 2024-01-02 PROCEDURE — 3078F DIAST BP <80 MM HG: CPT | Performed by: FAMILY MEDICINE

## 2024-01-02 PROCEDURE — 80061 LIPID PANEL: CPT

## 2024-01-02 PROCEDURE — 36415 COLL VENOUS BLD VENIPUNCTURE: CPT

## 2024-01-02 PROCEDURE — 90686 IIV4 VACC NO PRSV 0.5 ML IM: CPT | Performed by: FAMILY MEDICINE

## 2024-01-02 PROCEDURE — 80053 COMPREHEN METABOLIC PANEL: CPT

## 2024-01-02 PROCEDURE — 90471 IMMUNIZATION ADMIN: CPT | Performed by: FAMILY MEDICINE

## 2024-01-02 PROCEDURE — 3074F SYST BP LT 130 MM HG: CPT | Performed by: FAMILY MEDICINE

## 2024-01-02 PROCEDURE — 83036 HEMOGLOBIN GLYCOSYLATED A1C: CPT

## 2024-01-02 RX ORDER — CLOBETASOL PROPIONATE 0.5 MG/G
1 OINTMENT TOPICAL 2 TIMES DAILY PRN
Qty: 60 G | Refills: 1 | Status: SHIPPED | OUTPATIENT
Start: 2024-01-02

## 2024-01-02 RX ORDER — FLUOXETINE HYDROCHLORIDE 40 MG/1
CAPSULE ORAL
COMMUNITY
Start: 2023-12-28
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
## 2024-01-03 ENCOUNTER — OFFICE VISIT (OUTPATIENT)
Dept: SURGERY | Facility: CLINIC | Age: 49
End: 2024-01-03
Payer: COMMERCIAL

## 2024-01-03 VITALS
HEIGHT: 69.9 IN | SYSTOLIC BLOOD PRESSURE: 118 MMHG | HEART RATE: 108 BPM | BODY MASS INDEX: 39.22 KG/M2 | OXYGEN SATURATION: 97 % | WEIGHT: 274 LBS | DIASTOLIC BLOOD PRESSURE: 80 MMHG

## 2024-01-03 DIAGNOSIS — I10 ESSENTIAL HYPERTENSION: Primary | ICD-10-CM

## 2024-01-03 DIAGNOSIS — Z51.81 ENCOUNTER FOR THERAPEUTIC DRUG MONITORING: ICD-10-CM

## 2024-01-03 DIAGNOSIS — E66.9 OBESITY (BMI 30-39.9): ICD-10-CM

## 2024-01-03 DIAGNOSIS — F43.9 STRESS: ICD-10-CM

## 2024-01-03 DIAGNOSIS — Z12.5 PROSTATE CANCER SCREENING: Primary | ICD-10-CM

## 2024-01-03 PROCEDURE — 3074F SYST BP LT 130 MM HG: CPT | Performed by: INTERNAL MEDICINE

## 2024-01-03 PROCEDURE — 3008F BODY MASS INDEX DOCD: CPT | Performed by: INTERNAL MEDICINE

## 2024-01-03 PROCEDURE — 99214 OFFICE O/P EST MOD 30 MIN: CPT | Performed by: INTERNAL MEDICINE

## 2024-01-03 PROCEDURE — 3079F DIAST BP 80-89 MM HG: CPT | Performed by: INTERNAL MEDICINE

## 2024-01-03 RX ORDER — PHENTERMINE HYDROCHLORIDE 37.5 MG/1
37.5 TABLET ORAL
Qty: 30 TABLET | Refills: 3 | Status: SHIPPED | OUTPATIENT
Start: 2024-01-03

## 2024-01-03 NOTE — PROGRESS NOTES
[facility], [facility], [place]  [address]  [facility] [id number]  Dept: [phone]       Patient:  Corey Bartlett  :      3/1/1975  MRN:      [id number]    Chief Complaint:    Chief Complaint   Patient presents with    Follow - Up    Weight Management       SUBJECTIVE     History of Present Illness:  Corey is being seen today for a follow-up for non surgical weight loss.     Past Medical History:   Past Medical History:   Diagnosis Date    Anxiety 1994    Depression 1994    Esophageal reflux     Hypertriglyceridemia     Morbid obesity with BMI of 40.0-44.9, adult (HCC)     Obesity     Psoriasis     Stress         Comorbidities:  Back pain-Improvement?  yes, Joint pain-Improvement?  yes, Hypertension-Improvement?  yes, NAVI-Improvement?  yes and Snoring-Improvement?  yes    OBJECTIVE     Vitals: /80 (BP Location: Left arm, Patient Position: Sitting, Cuff Size: adult)   Pulse 108   Ht 5' 9.9\" (1.775 m)   Wt 274 lb (124.3 kg)   SpO2 97%   BMI 39.43 kg/m²     Initial weight loss: -01   Total weight loss: -09   Start weight: 283    Wt Readings from Last 3 Encounters:   24 274 lb (124.3 kg)   24 271 lb (122.9 kg)   23 275 lb (124.7 kg)       Patient Medications:    Current Outpatient Medications   Medication Sig Dispense Refill    semaglutide-weight management 2.4 MG/0.75ML Subcutaneous Solution Auto-injector Inject 0.75 mL (2.4 mg total) into the skin once a week for 4 doses. 3 mL 2    Phentermine HCl 37.5 MG Oral Tab Take 1 tablet (37.5 mg total) by mouth every morning before breakfast. 30 tablet 3    FLUoxetine HCl 40 MG Oral Cap       clobetasol 0.05 % External Ointment Apply 1 Application topically 2 (two) times daily as needed (Psoriasis flare-ups). Not to use for more than two weeks at a time. 60 g 1    buPROPion  MG Oral Tablet 24 Hr Take 1 tablet (150 mg total) by mouth daily. 90 tablet 0    buPROPion  MG  Oral Tablet 24 Hr       TOPIRAMATE 25 MG Oral Tab TAKE 1 TABLET(25 MG) BY MOUTH TWICE DAILY 180 tablet 1     Allergies:  Amoxicillin, Erythromycin, and Penicillins     Social History:    Social History     Socioeconomic History    Marital status:      Spouse name: Not on file    Number of children: Not on file    Years of education: Not on file    Highest education level: Not on file   Occupational History    Not on file   Tobacco Use    Smoking status: Former     Packs/day: 1.50     Years: 20.00     Additional pack years: 0.00     Total pack years: 30.00     Types: Cigarettes     Quit date: 2021     Years since quittin.0    Smokeless tobacco: Never   Vaping Use    Vaping Use: Never used   Substance and Sexual Activity    Alcohol use: Yes     Alcohol/week: 2.0 standard drinks of alcohol     Types: 2 Standard drinks or equivalent per week     Comment: rare bourbon     Drug use: Not Currently     Types: Cannabis     Comment: Occasionally     Sexual activity: Yes     Partners: Female   Other Topics Concern    Caffeine Concern Not Asked    Exercise No    Seat Belt Not Asked    Special Diet Not Asked    Stress Concern Yes    Weight Concern Yes    Grew up on a farm Not Asked    History of tanning Not Asked    Outdoor occupation No    Reaction to local anesthetic No    Pt has a pacemaker No    Pt has a defibrillator No   Social History Narrative    Not on file     Social Determinants of Health     Financial Resource Strain: Not on file   Food Insecurity: Not on file   Transportation Needs: Not on file   Physical Activity: Not on file   Stress: Not on file   Social Connections: Not on file   Housing Stability: Not on file     Surgical History:  No past surgical history on file.  Family History:    Family History   Problem Relation Age of Onset    Heart Attack Father         MI x 6    Diabetes Father     Alcohol and Other Disorders Associated Father     Cancer Mother         Kidney    Heart Attack Mother      Cancer Maternal Grandmother     Cancer Maternal Grandfather     Cancer Paternal Grandmother         Leukemia    Cancer Paternal Grandfather     Dementia Paternal Grandfather        Food Journal  Reviewed and Discussed:       Patient has a Food Journal?: yes   Patient is reading nutrition labels?  yes  Average Caloric Intake:     Average CHO Intake: 130  Is patient exercising? yes  Type of exercise? [employer]  cardio    Eating Habits  Patient states the following:  Eats 3 meal(s) per day  Length of time it takes to consume a meal:  20  # of snacks per day: 1 Type of snacks:  Apple/ trail mix  Amount of soda consumption per day:  diet  Amount of water (in ounces) per day:  64  Drinking between meals only:  yes  Toughest challenge:  exercise    Nutritional Goals  Limit carbohydrates to 100 gms per day, Eat 100-200 calories within 1 hour of waking  and Eat 3-4 cups of fresh fruits or vegetables daily    Behavior Modifications Reviewed and Discussed  Eat breakfast, Eat 3 meals per day, Plan meals in advance, Read nutrition labels, Drink 64 oz of water per day, Maintain a daily food journal, No drinking 30 minutes before or after meals, Utlize portion control strategies to reduce calorie intake, Identify triggers for eating and manage cues and Eat slowly and take 20 to 30 minutes to complete each meal    Exercise Goals Reviewed and Discussed    Continue going to [employer]    ROS:    Constitutional: positive for fatigue  Eyes: negative  Respiratory: negative  Cardiovascular: negative  Gastrointestinal: negative  Musculoskeletal:positive for arthralgias  Neurological: negative  Behavioral/Psych: negative  Endocrine: negative  All other systems were reviewed and are negative    Physical Exam:   General appearance: alert, appears stated age, cooperative and morbidly obese  Head: Normocephalic, without obvious abnormality, atraumatic  Eyes: conjunctivae/corneas clear. PERRL, EOM's intact. Fundi benign.  Back:  symmetric, no curvature. ROM normal. No CVA tenderness.  Lungs: clear to auscultation bilaterally  Heart: S1, S2 normal, no murmur, click, rub or gallop, regular rate and rhythm  Abdomen:  soft, obese, non tender  Extremities: extremities normal, atraumatic, no cyanosis or edema  Pulses: 2+ and symmetric  Skin: Skin color, texture, turgor normal. No rashes or lesions  Neurologic: Grossly normal    ASSESSMENT     HYPERCHOLESTEROLEMIA:  The patient states that his cholesterol has been well controlled on his current medication.    Lab Results   Component Value Date/Time    CHOLEST 156 01/02/2024 03:10 PM    LDL 91 01/02/2024 03:10 PM    HDL 41 01/02/2024 03:10 PM    TRIG 136 01/02/2024 03:10 PM    VLDL 22 01/02/2024 03:10 PM         Encounter Diagnosis(ses):   Encounter Diagnoses   Name Primary?    Essential hypertension Yes    Stress     Encounter for therapeutic drug monitoring     Obesity (BMI 30-39.9)        PLAN     Patient is not interested in bariatric surgery. Patient desires to pursue traditional weight loss at this time.      OBSTRUCTIVE SLEEP APNEA: Given the patient's history suggestive of obstructive sleep apnea as outlined above, consideration for obtaining a sleep study may be warranted.  Further consideration for obtaining the sleep study will be discussed with the patient's PCP.    DYSLIPIDEMIA: Stable on the above prescribed meal plan and medication. Liver function stable.    Lab Results   Component Value Date/Time    CHOLEST 156 01/02/2024 03:10 PM    LDL 91 01/02/2024 03:10 PM    HDL 41 01/02/2024 03:10 PM    TRIG 136 01/02/2024 03:10 PM    VLDL 22 01/02/2024 03:10 PM     Goals for next month:  1. Keep a food log.  2. Drink 48-64 ounces of non-caloric beverages per day. No fruit juices or regular soda.  3. Increase activity-upper body exercises, walk 10 minutes per day.  4. Increase fruit and vegetable servings to 5-6 per day.         Topiramate for cravings      PHENTERMINE:  Tolerating well  ekg  done    Completed TMS    Will start Wegovy      Follow up with PCP as scheduled    Diagnoses and all orders for this visit:    Essential hypertension    Stress    Encounter for therapeutic drug monitoring    Obesity (BMI 30-39.9)  -     semaglutide-weight management 2.4 MG/0.75ML Subcutaneous Solution Auto-injector; Inject 0.75 mL (2.4 mg total) into the skin once a week for 4 doses.  -     Phentermine HCl 37.5 MG Oral Tab; Take 1 tablet (37.5 mg total) by mouth every morning before breakfast.          John Escalera MD

## 2024-03-14 RX ORDER — BUPROPION HYDROCHLORIDE 300 MG/1
300 TABLET ORAL DAILY
Qty: 90 TABLET | Refills: 0 | Status: SHIPPED | OUTPATIENT
Start: 2024-03-14

## 2024-03-25 RX ORDER — BUPROPION HYDROCHLORIDE 150 MG/1
150 TABLET ORAL DAILY
Qty: 90 TABLET | Refills: 0 | OUTPATIENT
Start: 2024-03-25

## 2024-04-15 ENCOUNTER — LAB ENCOUNTER (OUTPATIENT)
Dept: LAB | Facility: REFERENCE LAB | Age: 49
End: 2024-04-15
Attending: FAMILY MEDICINE
Payer: COMMERCIAL

## 2024-04-15 ENCOUNTER — OFFICE VISIT (OUTPATIENT)
Facility: CLINIC | Age: 49
End: 2024-04-15
Payer: COMMERCIAL

## 2024-04-15 VITALS
SYSTOLIC BLOOD PRESSURE: 110 MMHG | BODY MASS INDEX: 39 KG/M2 | HEART RATE: 88 BPM | OXYGEN SATURATION: 96 % | DIASTOLIC BLOOD PRESSURE: 86 MMHG | WEIGHT: 273.38 LBS

## 2024-04-15 DIAGNOSIS — L40.9 PSORIASIS: Primary | ICD-10-CM

## 2024-04-15 DIAGNOSIS — R21 RASH: ICD-10-CM

## 2024-04-15 DIAGNOSIS — Z12.5 PROSTATE CANCER SCREENING: ICD-10-CM

## 2024-04-15 DIAGNOSIS — R97.20 ELEVATED PSA: Primary | ICD-10-CM

## 2024-04-15 DIAGNOSIS — Z83.2 FAMILY HISTORY OF SARCOIDOSIS: ICD-10-CM

## 2024-04-15 LAB
COMPLEXED PSA SERPL-MCNC: 2.39 NG/ML (ref ?–4)
CRP SERPL-MCNC: 0.5 MG/DL (ref ?–1)
ERYTHROCYTE [SEDIMENTATION RATE] IN BLOOD: 8 MM/HR

## 2024-04-15 PROCEDURE — 85652 RBC SED RATE AUTOMATED: CPT | Performed by: FAMILY MEDICINE

## 2024-04-15 PROCEDURE — 3079F DIAST BP 80-89 MM HG: CPT | Performed by: FAMILY MEDICINE

## 2024-04-15 PROCEDURE — 99214 OFFICE O/P EST MOD 30 MIN: CPT | Performed by: FAMILY MEDICINE

## 2024-04-15 PROCEDURE — 84153 ASSAY OF PSA TOTAL: CPT | Performed by: FAMILY MEDICINE

## 2024-04-15 PROCEDURE — 86140 C-REACTIVE PROTEIN: CPT | Performed by: FAMILY MEDICINE

## 2024-04-15 PROCEDURE — 3074F SYST BP LT 130 MM HG: CPT | Performed by: FAMILY MEDICINE

## 2024-04-15 RX ORDER — FLUOXETINE 10 MG/1
10 CAPSULE ORAL DAILY
COMMUNITY

## 2024-04-15 RX ORDER — TRAZODONE HYDROCHLORIDE 50 MG/1
50 TABLET ORAL NIGHTLY PRN
COMMUNITY
Start: 2024-03-20

## 2024-04-15 RX ORDER — FLUOXETINE HYDROCHLORIDE 20 MG/1
20 CAPSULE ORAL DAILY
COMMUNITY

## 2024-04-15 NOTE — PROGRESS NOTES
Subjective:   Corey Bartlett is a 49 year old male who presents for Skin (Patient complains of warm rashes all over body x 2 weeks. Patient states this condition runs in his family and it is called \"sarcoidal granuloma\".)     Patient presents for follow up psoriasis. At last visit in January with PCP was started on clobetasol and referred to derm. Patient has not been able to see derm yet. Patient does have family history of sarcoidosis. Patient has noted that taking ibuprofen and or tylenol will help with inflammation. Patient notes when pruritus flares will feel very fatigued. Patient has been trialed on oral steroids which did help significantly.     History/Other:    Chief Complaint Reviewed and Verified  Nursing Notes Reviewed and   Verified  Tobacco Reviewed  Allergies Reviewed  Medications Reviewed    Problem List Reviewed  Medical History Reviewed  Surgical History   Reviewed  Family History Reviewed  Social History Reviewed         Tobacco:  He smoked tobacco in the past but quit greater than 12 months ago.  Social History     Tobacco Use   Smoking Status Former    Current packs/day: 0.00    Average packs/day: 1.5 packs/day for 20.0 years (30.0 ttl pk-yrs)    Types: Cigarettes    Start date: 2001    Quit date: 2021    Years since quittin.3   Smokeless Tobacco Never        Current Outpatient Medications   Medication Sig Dispense Refill    traZODone 50 MG Oral Tab Take 1 tablet (50 mg total) by mouth nightly as needed. AT BEDTIME      FLUoxetine 10 MG Oral Cap Take 1 capsule (10 mg total) by mouth daily.      FLUoxetine 20 MG Oral Cap Take 1 capsule (20 mg total) by mouth daily.      buPROPion  MG Oral Tablet 24 Hr Take 1 tablet (300 mg total) by mouth daily. 90 tablet 0    Phentermine HCl 37.5 MG Oral Tab Take 1 tablet (37.5 mg total) by mouth every morning before breakfast. 30 tablet 3    buPROPion  MG Oral Tablet 24 Hr Take 1 tablet (150 mg total) by mouth daily. 90  tablet 0    TOPIRAMATE 25 MG Oral Tab TAKE 1 TABLET(25 MG) BY MOUTH TWICE DAILY 180 tablet 1    clobetasol 0.05 % External Ointment Apply 1 Application topically 2 (two) times daily as needed (Psoriasis flare-ups). Not to use for more than two weeks at a time. (Patient not taking: Reported on 4/15/2024) 60 g 1         Review of Systems:  Review of Systems   Constitutional: Negative.    HENT: Negative.     Eyes: Negative.    Respiratory: Negative.     Cardiovascular: Negative.    Gastrointestinal: Negative.    Genitourinary: Negative.    Musculoskeletal: Negative.    Skin:  Positive for rash.   Neurological: Negative.    Psychiatric/Behavioral: Negative.           Objective:   /86 (BP Location: Left arm, Patient Position: Sitting, Cuff Size: adult)   Pulse 88   Wt 273 lb 6 oz (124 kg)   SpO2 96%   BMI 39.34 kg/m²  Estimated body mass index is 39.34 kg/m² as calculated from the following:    Height as of 1/3/24: 5' 9.9\" (1.775 m).    Weight as of this encounter: 273 lb 6 oz (124 kg).  Physical Exam  Vitals and nursing note reviewed.   Constitutional:       General: He is not in acute distress.     Appearance: Normal appearance.   HENT:      Head: Normocephalic and atraumatic.   Eyes:      General:         Right eye: No discharge.         Left eye: No discharge.      Comments: Extraocular eye movements grossly intact   Pulmonary:      Effort: Pulmonary effort is normal.   Musculoskeletal:      Comments: Normal gait   Skin:     Findings: Rash present.      Comments: Noted various erythematous patches on bilateral anterior shins  +lichenification on flexerual aspects of fingers   +hyperpigmented tan area on bilateral plantar aspects of wrist from previous steroid use   Neurological:      General: No focal deficit present.      Mental Status: He is alert. Mental status is at baseline.   Psychiatric:         Mood and Affect: Mood normal.         Behavior: Behavior normal.         Assessment & Plan:   1. Psoriasis  (Primary)  2. Family history of sarcoidosis  3. Rash  -     Sed R[place]ni Tyler (Automated); Future; Expected date: 04/15/2024  -     C-Reactive Protein; Future; Expected date: 04/15/2024  -     Derm Referral - External    -patient has never been able to get psoriasis diagnosis confirmed via biopsy. Patient has strong family history of sarcoidosis. Ordered inflammatory markers and referred to [facility] skin dermatology.   -pending lab results will consider referral to rheum  -did  patient if dermatology appointment remote will send in steroid taper      Return if symptoms worsen or fail to improve.    Flory Bajwa MD, 4/15/2024, 2:29 PM

## 2024-04-20 DIAGNOSIS — E66.9 OBESITY (BMI 30-39.9): ICD-10-CM

## 2024-04-22 RX ORDER — PHENTERMINE HYDROCHLORIDE 37.5 MG/1
37.5 TABLET ORAL
Qty: 30 TABLET | Refills: 0 | Status: SHIPPED | OUTPATIENT
Start: 2024-04-22

## 2024-06-07 RX ORDER — BUPROPION HYDROCHLORIDE 300 MG/1
300 TABLET ORAL DAILY
Qty: 90 TABLET | Refills: 0 | Status: SHIPPED | OUTPATIENT
Start: 2024-06-07

## 2024-07-03 ENCOUNTER — OFFICE VISIT (OUTPATIENT)
Dept: SURGERY | Facility: CLINIC | Age: 49
End: 2024-07-03
Payer: COMMERCIAL

## 2024-07-03 VITALS
DIASTOLIC BLOOD PRESSURE: 86 MMHG | WEIGHT: 275.81 LBS | SYSTOLIC BLOOD PRESSURE: 134 MMHG | HEART RATE: 78 BPM | BODY MASS INDEX: 39.49 KG/M2 | HEIGHT: 69.9 IN | OXYGEN SATURATION: 96 %

## 2024-07-03 DIAGNOSIS — Z51.81 ENCOUNTER FOR THERAPEUTIC DRUG MONITORING: ICD-10-CM

## 2024-07-03 DIAGNOSIS — R63.2 BINGE EATING: ICD-10-CM

## 2024-07-03 DIAGNOSIS — E66.9 OBESITY (BMI 30-39.9): ICD-10-CM

## 2024-07-03 DIAGNOSIS — K76.0 FATTY LIVER: ICD-10-CM

## 2024-07-03 DIAGNOSIS — I10 ESSENTIAL HYPERTENSION: Primary | ICD-10-CM

## 2024-07-03 DIAGNOSIS — F43.9 STRESS: ICD-10-CM

## 2024-07-03 PROCEDURE — 3075F SYST BP GE 130 - 139MM HG: CPT | Performed by: INTERNAL MEDICINE

## 2024-07-03 PROCEDURE — 99214 OFFICE O/P EST MOD 30 MIN: CPT | Performed by: INTERNAL MEDICINE

## 2024-07-03 PROCEDURE — 3008F BODY MASS INDEX DOCD: CPT | Performed by: INTERNAL MEDICINE

## 2024-07-03 PROCEDURE — 3079F DIAST BP 80-89 MM HG: CPT | Performed by: INTERNAL MEDICINE

## 2024-07-03 RX ORDER — PHENTERMINE HYDROCHLORIDE 37.5 MG/1
37.5 TABLET ORAL
Qty: 30 TABLET | Refills: 3 | Status: SHIPPED | OUTPATIENT
Start: 2024-07-03

## 2024-07-03 RX ORDER — LISDEXAMFETAMINE DIMESYLATE 40 MG/1
40 CAPSULE ORAL DAILY
Qty: 30 CAPSULE | Refills: 0 | Status: SHIPPED | OUTPATIENT
Start: 2024-09-03 | End: 2024-10-03

## 2024-07-03 RX ORDER — LISDEXAMFETAMINE DIMESYLATE 40 MG/1
40 CAPSULE ORAL DAILY
Qty: 30 CAPSULE | Refills: 0 | Status: SHIPPED | OUTPATIENT
Start: 2024-07-03 | End: 2024-08-02

## 2024-07-03 RX ORDER — LISDEXAMFETAMINE DIMESYLATE 40 MG/1
40 CAPSULE ORAL DAILY
Qty: 30 CAPSULE | Refills: 0 | Status: SHIPPED | OUTPATIENT
Start: 2024-08-03 | End: 2024-09-02

## 2024-07-03 NOTE — PROGRESS NOTES
[facility], [facility], [place]  [address]  [facility] [address]  Dept: [phone]       Patient:  Corey Bartlett  :      3/1/1975  MRN:      [id number]    Chief Complaint:    Chief Complaint   Patient presents with    Follow - Up    Weight Management     Weight check        SUBJECTIVE     History of Present Illness:  Corey is being seen today for a follow-up for non surgical weight loss.     Past Medical History:   Past Medical History:    Anxiety    Depression    Esophageal reflux    Hypertriglyceridemia    Morbid obesity with BMI of 40.0-44.9, adult (HCC)    Obesity    Psoriasis    Stress        Comorbidities:  Back pain-Improvement?  yes, Joint pain-Improvement?  yes, Hypertension-Improvement?  yes, NAVI-Improvement?  yes and Snoring-Improvement?  yes    OBJECTIVE     Vitals: /86   Pulse 78   Ht 5' 9.9\" (1.775 m)   Wt 275 lb 12.8 oz (125.1 kg)   SpO2 96%   BMI 39.69 kg/m²     Initial weight loss: +01   Total weight loss: -7   Start weight: 283    Wt Readings from Last 3 Encounters:   24 275 lb 12.8 oz (125.1 kg)   04/15/24 273 lb 6 oz (124 kg)   24 274 lb (124.3 kg)       Patient Medications:    Current Outpatient Medications   Medication Sig Dispense Refill    BUPROPION  MG Oral Tablet 24 Hr TAKE 1 TABLET(300 MG) BY MOUTH DAILY 90 tablet 0    Phentermine HCl 37.5 MG Oral Tab Take 1 tablet (37.5 mg total) by mouth before breakfast. 30 tablet 0    traZODone 50 MG Oral Tab Take 1 tablet (50 mg total) by mouth nightly as needed. AT BEDTIME      FLUoxetine 10 MG Oral Cap Take 1 capsule (10 mg total) by mouth daily.      FLUoxetine 20 MG Oral Cap Take 1 capsule (20 mg total) by mouth daily.      clobetasol 0.05 % External Ointment Apply 1 Application topically 2 (two) times daily as needed (Psoriasis flare-ups). Not to use for more than two weeks at a time. (Patient not taking: Reported on 4/15/2024) 60 g 1    buPROPion  MG  Oral Tablet 24 Hr Take 1 tablet (150 mg total) by mouth daily. 90 tablet 0    TOPIRAMATE 25 MG Oral Tab TAKE 1 TABLET(25 MG) BY MOUTH TWICE DAILY 180 tablet 1     Allergies:  Amoxicillin, Erythromycin, and Penicillins     Social History:    Social History     Socioeconomic History    Marital status:      Spouse name: Not on file    Number of children: Not on file    Years of education: Not on file    Highest education level: Not on file   Occupational History    Not on file   Tobacco Use    Smoking status: Former     Current packs/day: 0.00     Average packs/day: 1.5 packs/day for 20.0 years (30.0 ttl pk-yrs)     Types: Cigarettes     Start date: 2001     Quit date: 2021     Years since quittin.5    Smokeless tobacco: Never   Vaping Use    Vaping status: Never Used   Substance and Sexual Activity    Alcohol use: Yes     Alcohol/week: 2.0 standard drinks of alcohol     Types: 2 Standard drinks or equivalent per week     Comment: rare bourbon     Drug use: Not Currently     Types: Cannabis     Comment: Occasionally     Sexual activity: Yes     Partners: Female   Other Topics Concern    Caffeine Concern Not Asked    Exercise No    Seat Belt Not Asked    Special Diet Not Asked    Stress Concern Yes    Weight Concern Yes    Grew up on a farm Not Asked    History of tanning Not Asked    Outdoor occupation No    Reaction to local anesthetic No    Pt has a pacemaker No    Pt has a defibrillator No   Social History Narrative    Not on file     Social Determinants of Health     Financial Resource Strain: Not on file   Food Insecurity: Not on file   Transportation Needs: Not on file   Physical Activity: Not on file   Stress: Not on file   Social Connections: Not on file   Housing Stability: Not on file     Surgical History:  History reviewed. No pertinent surgical history.  Family History:    Family History   Problem Relation Age of Onset    Heart Attack Father         MI x 6    Diabetes Father      Alcohol and Other Disorders Associated Father     Cancer Mother         Kidney    Heart Attack Mother     Cancer Maternal Grandmother     Cancer Maternal Grandfather     Cancer Paternal Grandmother         Leukemia    Cancer Paternal Grandfather     Dementia Paternal Grandfather        Food Journal  Reviewed and Discussed:       Patient has a Food Journal?: yes   Patient is reading nutrition labels?  yes  Average Caloric Intake:     Average CHO Intake: 130  Is patient exercising? yes  Type of exercise? [employer]  cardio    Eating Habits  Patient states the following:  Eats 3 meal(s) per day  Length of time it takes to consume a meal:  20  # of snacks per day: 1 Type of snacks:  Apple/ trail mix  Amount of soda consumption per day:  diet  Amount of water (in ounces) per day:  64  Drinking between meals only:  yes  Toughest challenge:  exercise    Nutritional Goals  Limit carbohydrates to 100 gms per day, Eat 100-200 calories within 1 hour of waking  and Eat 3-4 cups of fresh fruits or vegetables daily    Behavior Modifications Reviewed and Discussed  Eat breakfast, Eat 3 meals per day, Plan meals in advance, Read nutrition labels, Drink 64 oz of water per day, Maintain a daily food journal, No drinking 30 minutes before or after meals, Utlize portion control strategies to reduce calorie intake, Identify triggers for eating and manage cues and Eat slowly and take 20 to 30 minutes to complete each meal    Exercise Goals Reviewed and Discussed    Continue going to [employer]    ROS:    Constitutional: positive for fatigue  Eyes: negative  Respiratory: negative  Cardiovascular: negative  Gastrointestinal: negative  Musculoskeletal:positive for arthralgias  Neurological: negative  Behavioral/Psych: negative  Endocrine: negative  All other systems were reviewed and are negative    Physical Exam:   General appearance: alert, appears stated age, cooperative and morbidly obese  Head: Normocephalic, without obvious  abnormality, atraumatic  Eyes: conjunctivae/corneas clear. PERRL, EOM's intact. Fundi benign.  Back: symmetric, no curvature. ROM normal. No CVA tenderness.  Lungs: clear to auscultation bilaterally  Heart: S1, S2 normal, no murmur, click, rub or gallop, regular rate and rhythm  Abdomen:  soft, obese, non tender  Extremities: extremities normal, atraumatic, no cyanosis or edema  Pulses: 2+ and symmetric  Skin: Skin color, texture, turgor normal. No rashes or lesions  Neurologic: Grossly normal    ASSESSMENT     HYPERCHOLESTEROLEMIA:  The patient states that his cholesterol has been well controlled on his current medication.    Lab Results   Component Value Date/Time    CHOLEST 156 01/02/2024 03:10 PM    LDL 91 01/02/2024 03:10 PM    HDL 41 01/02/2024 03:10 PM    TRIG 136 01/02/2024 03:10 PM    VLDL 22 01/02/2024 03:10 PM         Encounter Diagnosis(ses):   Encounter Diagnoses   Name Primary?    Essential hypertension Yes    Stress     Encounter for therapeutic drug monitoring     Obesity (BMI 30-39.9)        PLAN     Patient is not interested in bariatric surgery. Patient desires to pursue traditional weight loss at this time.      OBSTRUCTIVE SLEEP APNEA: Given the patient's history suggestive of obstructive sleep apnea as outlined above, consideration for obtaining a sleep study may be warranted.  Further consideration for obtaining the sleep study will be discussed with the patient's PCP.    DYSLIPIDEMIA: Stable on the above prescribed meal plan and medication. Liver function stable.    Lab Results   Component Value Date/Time    CHOLEST 156 01/02/2024 03:10 PM    LDL 91 01/02/2024 03:10 PM    HDL 41 01/02/2024 03:10 PM    TRIG 136 01/02/2024 03:10 PM    VLDL 22 01/02/2024 03:10 PM     Goals for next month:  1. Keep a food log.  2. Drink 48-64 ounces of non-caloric beverages per day. No fruit juices or regular soda.  3. Increase activity-upper body exercises, walk 10 minutes per day.  4. Increase fruit and  vegetable servings to 5-6 per day.         Topiramate for cravings      PHENTERMINE:  Tolerating well  ekg done    Completed TMS    Will start [employer]gov[employer] 0.25  If not covered, will switch to [employer]      Follow up with PCP as scheduled    Diagnoses and all orders for this visit:    Essential hypertension    Stress    Encounter for therapeutic drug monitoring    Obesity (BMI 30-39.9)          John Escalera MD

## 2024-08-07 DIAGNOSIS — R63.2 BINGE EATING: ICD-10-CM

## 2024-08-08 RX ORDER — LISDEXAMFETAMINE DIMESYLATE 40 MG/1
40 CAPSULE ORAL DAILY
Qty: 30 CAPSULE | Refills: 0 | Status: SHIPPED | OUTPATIENT
Start: 2024-08-08 | End: 2024-09-07

## 2024-09-05 RX ORDER — BUPROPION HYDROCHLORIDE 300 MG/1
300 TABLET ORAL DAILY
Qty: 90 TABLET | Refills: 0 | Status: SHIPPED | OUTPATIENT
Start: 2024-09-05

## 2024-12-11 ENCOUNTER — OFFICE VISIT (OUTPATIENT)
Dept: SURGERY | Facility: CLINIC | Age: 49
End: 2024-12-11
Payer: COMMERCIAL

## 2024-12-11 VITALS
OXYGEN SATURATION: 99 % | SYSTOLIC BLOOD PRESSURE: 134 MMHG | BODY MASS INDEX: 40.49 KG/M2 | HEIGHT: 69.9 IN | HEART RATE: 98 BPM | DIASTOLIC BLOOD PRESSURE: 78 MMHG | WEIGHT: 282.81 LBS

## 2024-12-11 DIAGNOSIS — K76.0 FATTY LIVER: ICD-10-CM

## 2024-12-11 DIAGNOSIS — F43.9 STRESS: ICD-10-CM

## 2024-12-11 DIAGNOSIS — E66.01 MORBID OBESITY WITH BMI OF 40.0-44.9, ADULT (HCC): ICD-10-CM

## 2024-12-11 DIAGNOSIS — R63.2 BINGE EATING: ICD-10-CM

## 2024-12-11 DIAGNOSIS — Z51.81 ENCOUNTER FOR THERAPEUTIC DRUG MONITORING: ICD-10-CM

## 2024-12-11 DIAGNOSIS — I10 ESSENTIAL HYPERTENSION: Primary | ICD-10-CM

## 2024-12-11 PROCEDURE — 3078F DIAST BP <80 MM HG: CPT | Performed by: INTERNAL MEDICINE

## 2024-12-11 PROCEDURE — 3008F BODY MASS INDEX DOCD: CPT | Performed by: INTERNAL MEDICINE

## 2024-12-11 PROCEDURE — 3075F SYST BP GE 130 - 139MM HG: CPT | Performed by: INTERNAL MEDICINE

## 2024-12-11 PROCEDURE — 99214 OFFICE O/P EST MOD 30 MIN: CPT | Performed by: INTERNAL MEDICINE

## 2024-12-11 RX ORDER — DOXEPIN HYDROCHLORIDE 10 MG/1
10 CAPSULE ORAL NIGHTLY
COMMUNITY
Start: 2024-09-25

## 2024-12-11 RX ORDER — ARIPIPRAZOLE 2 MG/1
2 TABLET ORAL DAILY
COMMUNITY
Start: 2024-11-05

## 2024-12-11 RX ORDER — TIRZEPATIDE 5 MG/.5ML
5 INJECTION, SOLUTION SUBCUTANEOUS WEEKLY
Qty: 3 ML | Refills: 2 | Status: SHIPPED | OUTPATIENT
Start: 2024-12-11

## 2024-12-11 RX ORDER — SERDEXMETHYLPHENIDATE AND DEXMETHYLPHENIDATE 10.4; 52.3 MG/1; MG/1
1 CAPSULE ORAL EVERY MORNING
COMMUNITY
Start: 2024-11-04

## 2024-12-11 NOTE — PROGRESS NOTES
[facility], [facility], [place]  [address]  [facility] [address]  Dept: [phone]       Patient:  Corey Bartlett  :      3/1/1975  MRN:      [id number]    Chief Complaint:    Chief Complaint   Patient presents with    Follow - Up    Weight Management       SUBJECTIVE     History of Present Illness:  Corey is being seen today for a follow-up for non surgical weight loss.     Past Medical History:   Past Medical History:    Anxiety    Depression    Esophageal reflux    Hypertriglyceridemia    Morbid obesity with BMI of 40.0-44.9, adult (HCC)    Obesity    Psoriasis    Stress        Comorbidities:  Back pain-Improvement?  yes, Joint pain-Improvement?  yes, Hypertension-Improvement?  yes, NAVI-Improvement?  yes and Snoring-Improvement?  yes    OBJECTIVE     Vitals: /78   Pulse 98   Ht 5' 9.9\" (1.775 m)   Wt 282 lb 12.8 oz (128.3 kg)   SpO2 99%   BMI 40.69 kg/m²     Initial weight loss: +07   Total weight loss: -01   Start weight: 283    Wt Readings from Last 3 Encounters:   24 282 lb 12.8 oz (128.3 kg)   24 275 lb 12.8 oz (125.1 kg)   04/15/24 273 lb 6 oz (124 kg)       Patient Medications:    Current Outpatient Medications   Medication Sig Dispense Refill    BUPROPION  MG Oral Tablet 24 Hr TAKE 1 TABLET(300 MG) BY MOUTH DAILY 90 tablet 0    FLUoxetine 10 MG Oral Cap Take 1 capsule (10 mg total) by mouth daily.      FLUoxetine 20 MG Oral Cap Take 1 capsule (20 mg total) by mouth daily.      clobetasol 0.05 % External Ointment Apply 1 Application topically 2 (two) times daily as needed (Psoriasis flare-ups). Not to use for more than two weeks at a time. 60 g 1    buPROPion  MG Oral Tablet 24 Hr Take 1 tablet (150 mg total) by mouth daily. 90 tablet 0    Phentermine HCl 37.5 MG Oral Tab Take 1 tablet (37.5 mg total) by mouth before breakfast. 30 tablet 3    traZODone 50 MG Oral Tab Take 1 tablet (50 mg total) by mouth nightly  as needed. AT BEDTIME      TOPIRAMATE 25 MG Oral Tab TAKE 1 TABLET(25 MG) BY MOUTH TWICE DAILY 180 tablet 1     Allergies:  Amoxicillin, Erythromycin, and Penicillins     Social History:    Social History     Socioeconomic History    Marital status:      Spouse name: Not on file    Number of children: Not on file    Years of education: Not on file    Highest education level: Not on file   Occupational History    Not on file   Tobacco Use    Smoking status: Former     Current packs/day: 0.00     Average packs/day: 1.5 packs/day for 20.0 years (30.0 ttl pk-yrs)     Types: Cigarettes     Start date: 2001     Quit date: 2021     Years since quittin.9    Smokeless tobacco: Never   Vaping Use    Vaping status: Never Used   Substance and Sexual Activity    Alcohol use: Yes     Alcohol/week: 2.0 standard drinks of alcohol     Types: 2 Standard drinks or equivalent per week     Comment: rare bourbon     Drug use: Not Currently     Types: Cannabis     Comment: Occasionally     Sexual activity: Yes     Partners: Female   Other Topics Concern    Caffeine Concern Not Asked    Exercise No    Seat Belt Not Asked    Special Diet Not Asked    Stress Concern Yes    Weight Concern Yes    Grew up on a farm Not Asked    History of tanning Not Asked    Outdoor occupation No    Reaction to local anesthetic No    Pt has a pacemaker No    Pt has a defibrillator No   Social History Narrative    Not on file     Social Drivers of Health     Financial Resource Strain: Not on file   Food Insecurity: Not on file   Transportation Needs: Not on file   Physical Activity: Not on file   Stress: Not on file   Social Connections: Not on file   Housing Stability: Not on file     Surgical History:  No past surgical history on file.  Family History:    Family History   Problem Relation Age of Onset    Heart Attack Father         MI x 6    Diabetes Father     Alcohol and Other Disorders Associated Father     Cancer Mother          Kidney    Heart Attack Mother     Cancer Maternal Grandmother     Cancer Maternal Grandfather     Cancer Paternal Grandmother         Leukemia    Cancer Paternal Grandfather     Dementia Paternal Grandfather        Food Journal  Reviewed and Discussed:       Patient has a Food Journal?: yes   Patient is reading nutrition labels?  yes  Average Caloric Intake:     Average CHO Intake: 130  Is patient exercising? yes  Type of exercise? [employer]  cardio    Eating Habits  Patient states the following:  Eats 3 meal(s) per day  Length of time it takes to consume a meal:  20  # of snacks per day: 1 Type of snacks:  Apple/ trail mix  Amount of soda consumption per day:  diet  Amount of water (in ounces) per day:  64  Drinking between meals only:  yes  Toughest challenge:  exercise    Nutritional Goals  Limit carbohydrates to 100 gms per day, Eat 100-200 calories within 1 hour of waking  and Eat 3-4 cups of fresh fruits or vegetables daily    Behavior Modifications Reviewed and Discussed  Eat breakfast, Eat 3 meals per day, Plan meals in advance, Read nutrition labels, Drink 64 oz of water per day, Maintain a daily food journal, No drinking 30 minutes before or after meals, Utlize portion control strategies to reduce calorie intake, Identify triggers for eating and manage cues and Eat slowly and take 20 to 30 minutes to complete each meal    Exercise Goals Reviewed and Discussed    Continue going to [employer]    ROS:    Constitutional: positive for fatigue  Eyes: negative  Respiratory: negative  Cardiovascular: negative  Gastrointestinal: negative  Musculoskeletal:positive for arthralgias  Neurological: negative  Behavioral/Psych: negative  Endocrine: negative  All other systems were reviewed and are negative    Physical Exam:   General appearance: alert, appears stated age, cooperative and morbidly obese  Head: Normocephalic, without obvious abnormality, atraumatic  Eyes: conjunctivae/corneas clear. PERRL, EOM's  intact. Fundi benign.  Back: symmetric, no curvature. ROM normal. No CVA tenderness.  Lungs: clear to auscultation bilaterally  Heart: S1, S2 normal, no murmur, click, rub or gallop, regular rate and rhythm  Abdomen:  soft, obese, non tender  Extremities: extremities normal, atraumatic, no cyanosis or edema  Pulses: 2+ and symmetric  Skin: Skin color, texture, turgor normal. No rashes or lesions  Neurologic: Grossly normal    ASSESSMENT     HYPERCHOLESTEROLEMIA:  The patient states that his cholesterol has been well controlled on his current medication.    Lab Results   Component Value Date/Time    CHOLEST 156 01/02/2024 03:10 PM    LDL 91 01/02/2024 03:10 PM    HDL 41 01/02/2024 03:10 PM    TRIG 136 01/02/2024 03:10 PM    VLDL 22 01/02/2024 03:10 PM         Encounter Diagnosis(ses):   Encounter Diagnoses   Name Primary?    Essential hypertension Yes    Stress     Fatty liver     Encounter for therapeutic drug monitoring     Binge eating     Morbid obesity with BMI of 40.0-44.9, adult (HCC)        PLAN     Patient is not interested in bariatric surgery. Patient desires to pursue traditional weight loss at this time.      OBSTRUCTIVE SLEEP APNEA: Given the patient's history suggestive of obstructive sleep apnea as outlined above, consideration for obtaining a sleep study may be warranted.  Further consideration for obtaining the sleep study will be discussed with the patient's PCP.    DYSLIPIDEMIA: Stable on the above prescribed meal plan and medication. Liver function stable.    Lab Results   Component Value Date/Time    CHOLEST 156 01/02/2024 03:10 PM    LDL 91 01/02/2024 03:10 PM    HDL 41 01/02/2024 03:10 PM    TRIG 136 01/02/2024 03:10 PM    VLDL 22 01/02/2024 03:10 PM     Goals for next month:  1. Keep a food log.  2. Drink 48-64 ounces of non-caloric beverages per day. No fruit juices or regular soda.  3. Increase activity-upper body exercises, walk 10 minutes per day.  4. Increase fruit and vegetable servings  to 5-6 per day.         Topiramate for cravings      PHENTERMINE:  Tolerating well  ekg done    Completed TMS  Dr Noah Senior: not in stock  Will switch to Zepbound 5 mg      Follow up with PCP as scheduled    Diagnoses and all orders for this visit:    Essential hypertension    Stress    Fatty liver    Encounter for therapeutic drug monitoring    Binge eating    Morbid obesity with BMI of 40.0-44.9, adult (HCC)          John Escalera MD

## 2025-02-25 ENCOUNTER — TELEPHONE (OUTPATIENT)
Facility: CLINIC | Age: 50
End: 2025-02-25

## 2025-02-25 ENCOUNTER — LAB ENCOUNTER (OUTPATIENT)
Dept: LAB | Age: 50
End: 2025-02-25
Attending: FAMILY MEDICINE
Payer: COMMERCIAL

## 2025-02-25 ENCOUNTER — OFFICE VISIT (OUTPATIENT)
Facility: CLINIC | Age: 50
End: 2025-02-25
Payer: COMMERCIAL

## 2025-02-25 VITALS
SYSTOLIC BLOOD PRESSURE: 126 MMHG | HEIGHT: 69.9 IN | BODY MASS INDEX: 39.94 KG/M2 | DIASTOLIC BLOOD PRESSURE: 82 MMHG | WEIGHT: 279 LBS | OXYGEN SATURATION: 97 % | HEART RATE: 102 BPM

## 2025-02-25 DIAGNOSIS — Z00.00 ENCOUNTER FOR ROUTINE ADULT HEALTH EXAMINATION WITHOUT ABNORMAL FINDINGS: Primary | ICD-10-CM

## 2025-02-25 DIAGNOSIS — Z12.5 PROSTATE CANCER SCREENING: ICD-10-CM

## 2025-02-25 DIAGNOSIS — Z12.11 COLON CANCER SCREENING: ICD-10-CM

## 2025-02-25 DIAGNOSIS — Z00.00 ENCOUNTER FOR ROUTINE ADULT HEALTH EXAMINATION WITHOUT ABNORMAL FINDINGS: ICD-10-CM

## 2025-02-25 LAB
ALBUMIN SERPL-MCNC: 4.3 G/DL (ref 3.2–4.8)
ALBUMIN/GLOB SERPL: 1.8 {RATIO} (ref 1–2)
ALP LIVER SERPL-CCNC: 60 U/L
ALT SERPL-CCNC: 56 U/L
ANION GAP SERPL CALC-SCNC: 10 MMOL/L (ref 0–18)
AST SERPL-CCNC: 33 U/L (ref ?–34)
BASOPHILS # BLD AUTO: 0.08 X10(3) UL (ref 0–0.2)
BASOPHILS NFR BLD AUTO: 0.8 %
BILIRUB SERPL-MCNC: 1 MG/DL (ref 0.3–1.2)
BUN BLD-MCNC: 10 MG/DL (ref 9–23)
BUN/CREAT SERPL: 9.2 (ref 10–20)
CALCIUM BLD-MCNC: 8.9 MG/DL (ref 8.7–10.4)
CHLORIDE SERPL-SCNC: 106 MMOL/L (ref 98–112)
CHOLEST SERPL-MCNC: 135 MG/DL (ref ?–200)
CO2 SERPL-SCNC: 24 MMOL/L (ref 21–32)
COMPLEXED PSA SERPL-MCNC: 1.37 NG/ML (ref ?–4)
CREAT BLD-MCNC: 1.09 MG/DL
DEPRECATED RDW RBC AUTO: 39.1 FL (ref 35.1–46.3)
EGFRCR SERPLBLD CKD-EPI 2021: 83 ML/MIN/1.73M2 (ref 60–?)
EOSINOPHIL # BLD AUTO: 0.64 X10(3) UL (ref 0–0.7)
EOSINOPHIL NFR BLD AUTO: 6.7 %
ERYTHROCYTE [DISTWIDTH] IN BLOOD BY AUTOMATED COUNT: 12.8 % (ref 11–15)
EST. AVERAGE GLUCOSE BLD GHB EST-MCNC: 111 MG/DL (ref 68–126)
FASTING PATIENT LIPID ANSWER: NO
FASTING STATUS PATIENT QL REPORTED: NO
GLOBULIN PLAS-MCNC: 2.4 G/DL (ref 2–3.5)
GLUCOSE BLD-MCNC: 96 MG/DL (ref 70–99)
HBA1C MFR BLD: 5.5 % (ref ?–5.7)
HCT VFR BLD AUTO: 49.2 %
HDLC SERPL-MCNC: 38 MG/DL (ref 40–59)
HGB BLD-MCNC: 16.2 G/DL
IMM GRANULOCYTES # BLD AUTO: 0.03 X10(3) UL (ref 0–1)
IMM GRANULOCYTES NFR BLD: 0.3 %
LDLC SERPL CALC-MCNC: 76 MG/DL (ref ?–100)
LYMPHOCYTES # BLD AUTO: 1.62 X10(3) UL (ref 1–4)
LYMPHOCYTES NFR BLD AUTO: 17 %
MCH RBC QN AUTO: 28.1 PG (ref 26–34)
MCHC RBC AUTO-ENTMCNC: 32.9 G/DL (ref 31–37)
MCV RBC AUTO: 85.4 FL
MONOCYTES # BLD AUTO: 0.61 X10(3) UL (ref 0.1–1)
MONOCYTES NFR BLD AUTO: 6.4 %
NEUTROPHILS # BLD AUTO: 6.57 X10 (3) UL (ref 1.5–7.7)
NEUTROPHILS # BLD AUTO: 6.57 X10(3) UL (ref 1.5–7.7)
NEUTROPHILS NFR BLD AUTO: 68.8 %
NONHDLC SERPL-MCNC: 97 MG/DL (ref ?–130)
OSMOLALITY SERPL CALC.SUM OF ELEC: 289 MOSM/KG (ref 275–295)
PLATELET # BLD AUTO: 338 10(3)UL (ref 150–450)
POTASSIUM SERPL-SCNC: 3.5 MMOL/L (ref 3.5–5.1)
PROT SERPL-MCNC: 6.7 G/DL (ref 5.7–8.2)
RBC # BLD AUTO: 5.76 X10(6)UL
SODIUM SERPL-SCNC: 140 MMOL/L (ref 136–145)
TRIGL SERPL-MCNC: 117 MG/DL (ref 30–149)
VLDLC SERPL CALC-MCNC: 18 MG/DL (ref 0–30)
WBC # BLD AUTO: 9.6 X10(3) UL (ref 4–11)

## 2025-02-25 PROCEDURE — 80061 LIPID PANEL: CPT | Performed by: FAMILY MEDICINE

## 2025-02-25 PROCEDURE — 85025 COMPLETE CBC W/AUTO DIFF WBC: CPT | Performed by: FAMILY MEDICINE

## 2025-02-25 PROCEDURE — 99396 PREV VISIT EST AGE 40-64: CPT | Performed by: FAMILY MEDICINE

## 2025-02-25 PROCEDURE — 3008F BODY MASS INDEX DOCD: CPT | Performed by: FAMILY MEDICINE

## 2025-02-25 PROCEDURE — 3074F SYST BP LT 130 MM HG: CPT | Performed by: FAMILY MEDICINE

## 2025-02-25 PROCEDURE — 84153 ASSAY OF PSA TOTAL: CPT | Performed by: FAMILY MEDICINE

## 2025-02-25 PROCEDURE — 90471 IMMUNIZATION ADMIN: CPT | Performed by: FAMILY MEDICINE

## 2025-02-25 PROCEDURE — 3079F DIAST BP 80-89 MM HG: CPT | Performed by: FAMILY MEDICINE

## 2025-02-25 PROCEDURE — 83036 HEMOGLOBIN GLYCOSYLATED A1C: CPT | Performed by: FAMILY MEDICINE

## 2025-02-25 PROCEDURE — 90656 IIV3 VACC NO PRSV 0.5 ML IM: CPT | Performed by: FAMILY MEDICINE

## 2025-02-25 PROCEDURE — 80053 COMPREHEN METABOLIC PANEL: CPT | Performed by: FAMILY MEDICINE

## 2025-02-25 RX ORDER — ESZOPICLONE 3 MG/1
3 TABLET, FILM COATED ORAL NIGHTLY
COMMUNITY
Start: 2025-02-22
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
## 2025-02-25 NOTE — TELEPHONE ENCOUNTER
1st attempt to schedule telephone colon screening.     Reached busy signal.     Plan to await call back or follow up.     [facility] Staff     Please schedule TCS appointment upon return call.     Thank you!

## 2025-03-03 NOTE — TELEPHONE ENCOUNTER
2nd attempt to schedule telephone colon screening.     Sent [employer] ([employer] message reminder.     [facility] Staff     Please schedule TCS appointment upon return call.     Thank you!

## 2025-04-08 DIAGNOSIS — E66.01 MORBID OBESITY WITH BMI OF 40.0-44.9, ADULT (HCC): ICD-10-CM

## 2025-04-08 RX ORDER — TIRZEPATIDE 5 MG/.5ML
INJECTION, SOLUTION SUBCUTANEOUS
Qty: 2 ML | Refills: 0 | Status: SHIPPED | OUTPATIENT
Start: 2025-04-08

## 2025-05-12 DIAGNOSIS — E66.01 MORBID OBESITY WITH BMI OF 40.0-44.9, ADULT (HCC): ICD-10-CM

## 2025-05-13 RX ORDER — TIRZEPATIDE 5 MG/.5ML
INJECTION, SOLUTION SUBCUTANEOUS
Qty: 2 ML | Refills: 0 | Status: SHIPPED | OUTPATIENT
Start: 2025-05-13

## 2025-06-16 DIAGNOSIS — E66.01 MORBID OBESITY WITH BMI OF 40.0-44.9, ADULT (HCC): ICD-10-CM

## 2025-06-16 RX ORDER — TIRZEPATIDE 5 MG/.5ML
INJECTION, SOLUTION SUBCUTANEOUS
Qty: 2 ML | Refills: 3 | Status: SHIPPED | OUTPATIENT
Start: 2025-06-16

## 2025-06-18 ENCOUNTER — OFFICE VISIT (OUTPATIENT)
Dept: SURGERY | Facility: CLINIC | Age: 50
End: 2025-06-18
Payer: COMMERCIAL

## 2025-06-18 VITALS
BODY MASS INDEX: 39.94 KG/M2 | HEART RATE: 87 BPM | SYSTOLIC BLOOD PRESSURE: 128 MMHG | OXYGEN SATURATION: 97 % | WEIGHT: 279 LBS | DIASTOLIC BLOOD PRESSURE: 80 MMHG | HEIGHT: 69.9 IN

## 2025-06-18 DIAGNOSIS — I10 ESSENTIAL HYPERTENSION: ICD-10-CM

## 2025-06-18 DIAGNOSIS — R63.2 BINGE EATING: ICD-10-CM

## 2025-06-18 DIAGNOSIS — F43.9 STRESS: ICD-10-CM

## 2025-06-18 DIAGNOSIS — E66.9 OBESITY (BMI 30-39.9): ICD-10-CM

## 2025-06-18 DIAGNOSIS — K76.0 FATTY LIVER: Primary | ICD-10-CM

## 2025-06-18 DIAGNOSIS — Z51.81 ENCOUNTER FOR THERAPEUTIC DRUG MONITORING: ICD-10-CM

## 2025-06-18 DIAGNOSIS — E66.01 MORBID OBESITY WITH BMI OF 40.0-44.9, ADULT (HCC): ICD-10-CM

## 2025-06-18 PROCEDURE — 3008F BODY MASS INDEX DOCD: CPT | Performed by: INTERNAL MEDICINE

## 2025-06-18 PROCEDURE — 99214 OFFICE O/P EST MOD 30 MIN: CPT | Performed by: INTERNAL MEDICINE

## 2025-06-18 PROCEDURE — 3079F DIAST BP 80-89 MM HG: CPT | Performed by: INTERNAL MEDICINE

## 2025-06-18 PROCEDURE — 3074F SYST BP LT 130 MM HG: CPT | Performed by: INTERNAL MEDICINE

## 2025-06-18 RX ORDER — TOPIRAMATE 25 MG/1
25 TABLET, FILM COATED ORAL 2 TIMES DAILY
Qty: 180 TABLET | Refills: 1 | Status: SHIPPED | OUTPATIENT
Start: 2025-06-18

## 2025-06-18 RX ORDER — ROFLUMILAST 3 MG/G
CREAM TOPICAL
COMMUNITY
Start: 2025-06-16

## 2025-06-18 NOTE — PROGRESS NOTES
[facility], [facility], [place]  [address]  [facility] [address]  Dept: [phone]       Patient:  Corey Bartlett  :      3/1/1975  MRN:      [id number]    Chief Complaint:    Chief Complaint   Patient presents with    Follow - Up    Weight Management       SUBJECTIVE     History of Present Illness:  Corey is being seen today for a follow-up for non surgical weight loss.     Past Medical History:   Past Medical History:    Anxiety    Depression    Esophageal reflux    Hypertriglyceridemia    Morbid obesity with BMI of 40.0-44.9, adult (HCC)    Obesity    Psoriasis    Stress        Comorbidities:  Back pain-Improvement?  yes, Joint pain-Improvement?  yes, Hypertension-Improvement?  yes, NAVI-Improvement?  yes and Snoring-Improvement?  yes    OBJECTIVE     Vitals: /80   Pulse 87   Ht 5' 9.9\" (1.775 m)   Wt 279 lb (126.6 kg)   SpO2 97%   BMI 40.15 kg/m²     Initial weight loss: -03   Total weight loss: -04   Start weight: 283    Wt Readings from Last 3 Encounters:   25 279 lb (126.6 kg)   25 279 lb (126.6 kg)   24 282 lb 12.8 oz (128.3 kg)       Patient Medications:    Current Outpatient Medications   Medication Sig Dispense Refill    ZEPBOUND 5 MG/0.5ML Subcutaneous Solution Auto-injector INJECT 5 MG UNDER THE SKIN ONE DAY A WEEK 2 mL 3    Eszopiclone 3 MG Oral Tab Take 1 tablet (3 mg total) by mouth nightly.      ARIPiprazole 2 MG Oral Tab Take 1 tablet (2 mg total) by mouth daily.      AZSTARYS 52.3-10.4 MG Oral Cap Take 1 capsule by mouth every morning.      BUPROPION  MG Oral Tablet 24 Hr TAKE 1 TABLET(300 MG) BY MOUTH DAILY 90 tablet 0    FLUoxetine 10 MG Oral Cap Take 1 capsule (10 mg total) by mouth daily.      FLUoxetine 20 MG Oral Cap Take 1 capsule (20 mg total) by mouth daily.      clobetasol 0.05 % External Ointment Apply 1 Application topically 2 (two) times daily as needed (Psoriasis flare-ups). Not to use for  more than two weeks at a time. 60 g 1    buPROPion  MG Oral Tablet 24 Hr Take 1 tablet (150 mg total) by mouth daily. 90 tablet 0    TOPIRAMATE 25 MG Oral Tab TAKE 1 TABLET(25 MG) BY MOUTH TWICE DAILY 180 tablet 1     Allergies:  Amoxicillin, Erythromycin, and Penicillins     Social History:    Social History     Socioeconomic History    Marital status:      Spouse name: Not on file    Number of children: Not on file    Years of education: Not on file    Highest education level: Not on file   Occupational History    Not on file   Tobacco Use    Smoking status: Former     Current packs/day: 0.00     Average packs/day: 1.5 packs/day for 20.0 years (30.0 ttl pk-yrs)     Types: Cigarettes     Start date: 12/25/2001     Quit date: 12/25/2021     Years since quitting: 3.4    Smokeless tobacco: Never   Vaping Use    Vaping status: Never Used   Substance and Sexual Activity    Alcohol use: Yes     Alcohol/week: 2.0 standard drinks of alcohol     Types: 2 Standard drinks or equivalent per week     Comment: rare bourbon     Drug use: Not Currently     Types: Cannabis     Comment: Occasionally     Sexual activity: Yes     Partners: Female   Other Topics Concern    Caffeine Concern Not Asked    Exercise No    Seat Belt Not Asked    Special Diet Not Asked    Stress Concern Yes    Weight Concern Yes    Grew up [place] a farm Not Asked    History of tanning Not Asked    Outdoor occupation No    Reaction to local anesthetic No    Pt has a pacemaker No    Pt has a defibrillator No   Social History Narrative    Not on file     Social Drivers of Health     Food Insecurity: Not on file   Transportation Needs: Not on file   Stress: Not on file   Housing Stability: Not on file     Surgical History:  No past surgical history on file.  Family History:    Family History   Problem Relation Age of Onset    Heart Attack Father         MI x 6    Diabetes Father     Alcohol and Other Disorders Associated Father     Cancer Mother          Kidney    Heart Attack Mother     Cancer Maternal Grandmother     Cancer Maternal Grandfather     Cancer Paternal Grandmother         Leukemia    Cancer Paternal Grandfather     Dementia Paternal Grandfather        Food Journal  Reviewed and Discussed:       Patient has a Food Journal?: yes   Patient is reading nutrition labels?  yes  Average Caloric Intake:     Average CHO Intake: 130  Is patient exercising? yes  Type of exercise? [employer]  cardio    Eating Habits  Patient states the following:  Eats 3 meal(s) per day  Length of time it takes to consume a meal:  20  # of snacks per day: 1 Type of snacks:  Apple/ trail mix  Amount of soda consumption per day:  diet  Amount of water (in ounces) per day:  64  Drinking between meals only:  yes  Toughest challenge:  exercise    Nutritional Goals  Limit carbohydrates to 100 gms per day, Eat 100-200 calories within 1 hour of waking  and Eat 3-4 cups of fresh fruits or vegetables daily    Behavior Modifications Reviewed and Discussed  Eat breakfast, Eat 3 meals per day, Plan meals in advance, Read nutrition labels, Drink 64 oz of water per day, Maintain a daily food journal, No drinking 30 minutes before or after meals, Utlize portion control strategies to reduce calorie intake, Identify triggers for eating and manage cues and Eat slowly and take 20 to 30 minutes to complete each meal    Exercise Goals Reviewed and Discussed    Continue going to [employer]    ROS:    Constitutional: positive for fatigue  Eyes: negative  Respiratory: negative  Cardiovascular: negative  Gastrointestinal: negative  Musculoskeletal:positive for arthralgias  Neurological: negative  Behavioral/Psych: negative  Endocrine: negative  All other systems were reviewed and are negative    Physical Exam:   General appearance: alert, appears stated age, cooperative and morbidly obese  Head: Normocephalic, without obvious abnormality, atraumatic  Eyes: conjunctivae/corneas clear. PERRL, EOM's  intact. Fundi benign.  Back: symmetric, no curvature. ROM normal. No CVA tenderness.  Lungs: clear to auscultation bilaterally  Heart: S1, S2 normal, no murmur, click, rub or gallop, regular rate and rhythm  Abdomen: soft, obese, non tender  Extremities: extremities normal, atraumatic, no cyanosis or edema  Pulses: 2+ and symmetric  Skin: Skin color, texture, turgor normal. No rashes or lesions  Neurologic: Grossly normal    ASSESSMENT     HYPERCHOLESTEROLEMIA:  The patient states that his cholesterol has been well controlled on his current medication.    Lab Results   Component Value Date/Time    CHOLEST 135 02/25/2025 02:26 PM    LDL 76 02/25/2025 02:26 PM    HDL 38 (L) 02/25/2025 02:26 PM    TRIG 117 02/25/2025 02:26 PM    VLDL 18 02/25/2025 02:26 PM         Encounter Diagnosis(ses):   Encounter Diagnoses   Name Primary?    Fatty liver Yes    Essential hypertension     Binge eating     Morbid obesity with BMI of 40.0-44.9, adult ([facility])     Encounter for therapeutic drug monitoring     Stress     Obesity (BMI 30-39.9)        PLAN     Patient is not interested in bariatric surgery. Patient desires to pursue traditional weight loss at this time.      OBSTRUCTIVE SLEEP APNEA: Given the patient's history suggestive of obstructive sleep apnea as outlined above, consideration for obtaining a sleep study may be warranted.  Further consideration for obtaining the sleep study will be discussed with the patient's PCP.    DYSLIPIDEMIA: Stable on the above prescribed meal plan and medication. Liver function stable.    Lab Results   Component Value Date/Time    CHOLEST 135 02/25/2025 02:26 PM    LDL 76 02/25/2025 02:26 PM    HDL 38 (L) 02/25/2025 02:26 PM    TRIG 117 02/25/2025 02:26 PM    VLDL 18 02/25/2025 02:26 PM     Goals for next month:  1. Keep a food log.  2. Drink 48-64 ounces of non-caloric beverages per day. No fruit juices or regular soda.  3. Increase activity-upper body exercises, walk 10 minutes per day.  4.  Increase fruit and vegetable servings to 5-6 per day.         Topiramate for cravings      PHENTERMINE:  Tolerating well  ekg done    Completed TMS  Dr Noah Chavez 5 mg      Follow up with PCP as scheduled    Diagnoses and all orders for this visit:    Fatty liver    Essential hypertension    Binge eating    Morbid obesity with BMI of 40.0-44.9, adult (HCC)    Encounter for therapeutic drug monitoring    Stress    Obesity (BMI 30-39.9)          John Escalera MD

## (undated) DIAGNOSIS — F17.200 TOBACCO DEPENDENCE: ICD-10-CM

## (undated) DIAGNOSIS — F43.9 STRESS: ICD-10-CM

## (undated) NOTE — LETTER
Date: 3/4/2021    Patient Name: Say Dean          To Whom it may concern:        Please be advised that Say Dean 3/1/1975  is a patient under my care and qualifies for [place] phase 1b [place] Vacc[place].    If you have any questions, please contact my

## (undated) NOTE — LETTER
Patient Name: Madelyn Phillips        : 3/1/1975       Medical Record #: [id number]    CONSENT FOR PROCEDURES/SEDATION    Date: 2019       Time: 2:39 PM        1.  I authorize the performance upon Madelyn Phillips the following:    Right Shoulder Pain  _

## (undated) NOTE — ED AVS SNAPSHOT
[facility] Emergency Department    [address] [address]     [place] [address]    Phone:  [phone]    Fax:  Milton Burroughs   MRN: [id number]    Department:  [facility] Emergency Department   Date of Visit:  5/24/2 and Class Registration line at [phone] or find a doctor online by visiting [email or web address].    IF THERE IS ANY CHANGE OR WORSENING OF YOUR CONDITION, CALL YOUR PRIMARY CARE PHYSICIAN AT ONCE OR RETURN IMMEDIATELY TO [address] [place] [address].     If

## (undated) NOTE — ED AVS SNAPSHOT
[facility] Emergency Department    [address] [address]     [place] [address]    Phone:  [phone]    Fax:  Milton Burroughs   MRN: [id number]    Department:  [facility] Emergency Department   Date of Visit:  5/24/2 It is our goal to assure that you are completely satisfied with every aspect of your visit today.   In an effort to constantly improve our service to you, we would appreciate any positive or negative feedback related to the care you received in our emergenc [facility] account. You may have had testing done that requires us to contact you. Please make sure we have your correct phone number on file.       I certified that I have received a copy of the aftercare instructions; that these instructions have been expl [facility] will allow you to access patient instructions from your recent visit,  view other health information, and more. To sign up or find more information, go to [email or web address] and click on the Sign Up Now link in the [employer] box.      Enter

## (undated) NOTE — LETTER
[address]  [address] [address]  Dept: [phone]      February 25, 2018    Patient: Christiano Alcantar   Date of Visit: 2/25/2018       To Whom It May Concern:    [facility]

## (undated) NOTE — LETTER
Date: 1/26/2018    Patient Name: Jerson Tsai          To Whom it may concern: This letter has been written at the patient's request. The above patient was seen at the [facility] for treatment of a medical condition.     This patient keyanna